# Patient Record
Sex: MALE | Race: WHITE | Employment: UNEMPLOYED | URBAN - METROPOLITAN AREA
[De-identification: names, ages, dates, MRNs, and addresses within clinical notes are randomized per-mention and may not be internally consistent; named-entity substitution may affect disease eponyms.]

---

## 2024-11-15 ENCOUNTER — APPOINTMENT (OUTPATIENT)
Dept: CT IMAGING | Age: 79
DRG: 870 | End: 2024-11-15
Payer: MEDICARE

## 2024-11-15 ENCOUNTER — HOSPITAL ENCOUNTER (INPATIENT)
Age: 79
LOS: 5 days | Discharge: HOSPICE | DRG: 870 | End: 2024-11-20
Attending: EMERGENCY MEDICINE | Admitting: INTERNAL MEDICINE
Payer: MEDICARE

## 2024-11-15 ENCOUNTER — APPOINTMENT (OUTPATIENT)
Dept: GENERAL RADIOLOGY | Age: 79
DRG: 870 | End: 2024-11-15
Payer: MEDICARE

## 2024-11-15 DIAGNOSIS — A41.9 SEPTIC SHOCK (HCC): ICD-10-CM

## 2024-11-15 DIAGNOSIS — I48.92 ATRIAL FLUTTER, UNSPECIFIED TYPE (HCC): ICD-10-CM

## 2024-11-15 DIAGNOSIS — N17.9 AKI (ACUTE KIDNEY INJURY) (HCC): ICD-10-CM

## 2024-11-15 DIAGNOSIS — J96.01 ACUTE RESPIRATORY FAILURE WITH HYPOXIA: Primary | ICD-10-CM

## 2024-11-15 DIAGNOSIS — R65.21 SEPTIC SHOCK (HCC): ICD-10-CM

## 2024-11-15 DIAGNOSIS — S06.5XAA SUBDURAL HEMATOMA: ICD-10-CM

## 2024-11-15 DIAGNOSIS — J18.9 PNEUMONIA DUE TO INFECTIOUS ORGANISM, UNSPECIFIED LATERALITY, UNSPECIFIED PART OF LUNG: ICD-10-CM

## 2024-11-15 DIAGNOSIS — J18.9 PNEUMONIA OF BOTH LOWER LOBES DUE TO INFECTIOUS ORGANISM: ICD-10-CM

## 2024-11-15 PROBLEM — E87.29 HIGH ANION GAP METABOLIC ACIDOSIS: Status: ACTIVE | Noted: 2024-11-15

## 2024-11-15 PROBLEM — F10.90 CHRONIC ALCOHOL USE: Status: ACTIVE | Noted: 2024-11-15

## 2024-11-15 PROBLEM — E87.0 HYPERNATREMIA: Status: ACTIVE | Noted: 2024-11-15

## 2024-11-15 PROBLEM — N18.9 ACUTE KIDNEY INJURY SUPERIMPOSED ON CKD (HCC): Status: ACTIVE | Noted: 2024-11-15

## 2024-11-15 PROBLEM — G93.41 ACUTE METABOLIC ENCEPHALOPATHY: Status: ACTIVE | Noted: 2024-11-15

## 2024-11-15 PROBLEM — J96.00 ACUTE RESPIRATORY FAILURE: Status: ACTIVE | Noted: 2024-11-15

## 2024-11-15 LAB
AADO2: 397.4 MMHG
AADO2: 459.8 MMHG
AADO2: 511.8 MMHG
ALBUMIN SERPL-MCNC: 3.7 G/DL (ref 3.5–5.2)
ALP SERPL-CCNC: 80 U/L (ref 40–129)
ALT SERPL-CCNC: 28 U/L (ref 0–40)
AMMONIA PLAS-SCNC: 20 UMOL/L (ref 16–60)
AMPHET UR QL SCN: NEGATIVE
ANION GAP SERPL CALCULATED.3IONS-SCNC: 13 MMOL/L (ref 7–16)
ANION GAP SERPL CALCULATED.3IONS-SCNC: 17 MMOL/L (ref 7–16)
ANION GAP SERPL CALCULATED.3IONS-SCNC: 18 MMOL/L (ref 7–16)
AST SERPL-CCNC: 46 U/L (ref 0–39)
B PARAP IS1001 DNA NPH QL NAA+NON-PROBE: NOT DETECTED
B PERT DNA SPEC QL NAA+PROBE: NOT DETECTED
B.E.: -10.2 MMOL/L (ref -3–3)
B.E.: -2 MMOL/L (ref -3–3)
B.E.: -8.6 MMOL/L (ref -3–3)
BARBITURATES UR QL SCN: NEGATIVE
BASOPHILS # BLD: 0 K/UL (ref 0–0.2)
BASOPHILS NFR BLD: 0 % (ref 0–2)
BENZODIAZ UR QL: NEGATIVE
BILIRUB SERPL-MCNC: 0.9 MG/DL (ref 0–1.2)
BNP SERPL-MCNC: 3598 PG/ML (ref 0–450)
BUN SERPL-MCNC: 73 MG/DL (ref 6–23)
BUN SERPL-MCNC: 74 MG/DL (ref 6–23)
BUN SERPL-MCNC: 76 MG/DL (ref 6–23)
BUPRENORPHINE UR QL: NEGATIVE
C PNEUM DNA NPH QL NAA+NON-PROBE: NOT DETECTED
CALCIUM SERPL-MCNC: 10 MG/DL (ref 8.6–10.2)
CALCIUM SERPL-MCNC: 8.1 MG/DL (ref 8.6–10.2)
CALCIUM SERPL-MCNC: 8.6 MG/DL (ref 8.6–10.2)
CANNABINOIDS UR QL SCN: NEGATIVE
CHLORIDE SERPL-SCNC: 116 MMOL/L (ref 98–107)
CHLORIDE SERPL-SCNC: 116 MMOL/L (ref 98–107)
CHLORIDE SERPL-SCNC: 118 MMOL/L (ref 98–107)
CHLORIDE UR-SCNC: <20 MMOL/L
CK SERPL-CCNC: 532 U/L (ref 20–200)
CO2 SERPL-SCNC: 18 MMOL/L (ref 22–29)
CO2 SERPL-SCNC: 21 MMOL/L (ref 22–29)
CO2 SERPL-SCNC: 22 MMOL/L (ref 22–29)
COCAINE UR QL SCN: NEGATIVE
COHB: 0.2 % (ref 0–1.5)
COHB: 0.3 % (ref 0–1.5)
COHB: 0.3 % (ref 0–1.5)
CREAT SERPL-MCNC: 3.4 MG/DL (ref 0.7–1.2)
CREAT SERPL-MCNC: 3.5 MG/DL (ref 0.7–1.2)
CREAT SERPL-MCNC: 3.6 MG/DL (ref 0.7–1.2)
CREAT UR-MCNC: 172.4 MG/DL (ref 40–278)
CRITICAL: ABNORMAL
CRP SERPL HS-MCNC: 286 MG/L (ref 0–5)
DATE ANALYZED: ABNORMAL
DATE OF COLLECTION: ABNORMAL
EOSINOPHIL # BLD: 0 K/UL (ref 0.05–0.5)
EOSINOPHILS RELATIVE PERCENT: 0 % (ref 0–6)
ERYTHROCYTE [DISTWIDTH] IN BLOOD BY AUTOMATED COUNT: 12.3 % (ref 11.5–15)
ERYTHROCYTE [SEDIMENTATION RATE] IN BLOOD BY WESTERGREN METHOD: 64 MM/HR (ref 0–15)
FENTANYL UR QL: POSITIVE
FIO2: 100 %
FIO2: 70 %
FIO2: 80 %
FLUAV RNA NPH QL NAA+NON-PROBE: NOT DETECTED
FLUBV RNA NPH QL NAA+NON-PROBE: NOT DETECTED
FOLATE SERPL-MCNC: 17.8 NG/ML (ref 4.8–24.2)
GFR, ESTIMATED: 17 ML/MIN/1.73M2
GFR, ESTIMATED: 17 ML/MIN/1.73M2
GFR, ESTIMATED: 18 ML/MIN/1.73M2
GLUCOSE BLD-MCNC: 403 MG/DL (ref 74–99)
GLUCOSE SERPL-MCNC: 147 MG/DL (ref 74–99)
GLUCOSE SERPL-MCNC: 308 MG/DL (ref 74–99)
GLUCOSE SERPL-MCNC: 447 MG/DL (ref 74–99)
HADV DNA NPH QL NAA+NON-PROBE: NOT DETECTED
HCO3: 15.7 MMOL/L (ref 22–26)
HCO3: 18 MMOL/L (ref 22–26)
HCO3: 21.4 MMOL/L (ref 22–26)
HCOV 229E RNA NPH QL NAA+NON-PROBE: NOT DETECTED
HCOV HKU1 RNA NPH QL NAA+NON-PROBE: NOT DETECTED
HCOV NL63 RNA NPH QL NAA+NON-PROBE: NOT DETECTED
HCOV OC43 RNA NPH QL NAA+NON-PROBE: NOT DETECTED
HCT VFR BLD AUTO: 44.3 % (ref 37–54)
HGB BLD-MCNC: 13.7 G/DL (ref 12.5–16.5)
HHB: 2.1 % (ref 0–5)
HHB: 7.4 % (ref 0–5)
HHB: 7.4 % (ref 0–5)
HMPV RNA NPH QL NAA+NON-PROBE: NOT DETECTED
HPIV1 RNA NPH QL NAA+NON-PROBE: NOT DETECTED
HPIV2 RNA NPH QL NAA+NON-PROBE: NOT DETECTED
HPIV3 RNA NPH QL NAA+NON-PROBE: NOT DETECTED
HPIV4 RNA NPH QL NAA+NON-PROBE: NOT DETECTED
INR PPP: 1.5
LAB: ABNORMAL
LACTATE BLDV-SCNC: 3.3 MMOL/L (ref 0.5–1.9)
LACTATE BLDV-SCNC: 3.5 MMOL/L (ref 0.5–2.2)
LACTATE BLDV-SCNC: 4.4 MMOL/L (ref 0.5–1.9)
LIPASE SERPL-CCNC: 13 U/L (ref 13–60)
LYMPHOCYTES NFR BLD: 0 K/UL (ref 1.5–4)
LYMPHOCYTES RELATIVE PERCENT: 0 % (ref 20–42)
Lab: 1004
Lab: 1945
Lab: 600
M PNEUMO DNA NPH QL NAA+NON-PROBE: NOT DETECTED
MAGNESIUM SERPL-MCNC: 2 MG/DL (ref 1.6–2.6)
MCH RBC QN AUTO: 29.5 PG (ref 26–35)
MCHC RBC AUTO-ENTMCNC: 30.9 G/DL (ref 32–34.5)
MCV RBC AUTO: 95.3 FL (ref 80–99.9)
METHADONE UR QL: NEGATIVE
METHB: 0.4 % (ref 0–1.5)
METHB: 0.6 % (ref 0–1.5)
METHB: 0.6 % (ref 0–1.5)
MODE: AC
MONOCYTES NFR BLD: 1.9 K/UL (ref 0.1–0.95)
MONOCYTES NFR BLD: 7 % (ref 2–12)
NEUTROPHILS NFR BLD: 93 % (ref 43–80)
NEUTS SEG NFR BLD: 25.4 K/UL (ref 1.8–7.3)
O2 SATURATION: 92.5 % (ref 92–98.5)
O2 SATURATION: 92.5 % (ref 92–98.5)
O2 SATURATION: 97.9 % (ref 92–98.5)
O2HB: 91.7 % (ref 94–97)
O2HB: 91.8 % (ref 94–97)
O2HB: 97.2 % (ref 94–97)
OPERATOR ID: 1741
OPERATOR ID: 187
OPERATOR ID: 2420
OPIATES UR QL SCN: NEGATIVE
OSMOLALITY UR: 482 MOSM/KG (ref 300–900)
OXYCODONE UR QL SCN: NEGATIVE
PATIENT TEMP: 37 C
PCO2: 31.9 MMHG (ref 35–45)
PCO2: 34.9 MMHG (ref 35–45)
PCO2: 40.9 MMHG (ref 35–45)
PCP UR QL SCN: NEGATIVE
PEEP/CPAP: 8 CMH2O
PEEP/CPAP: 8 CMH2O
PFO2: 0.93 MMHG/%
PFO2: 0.96 MMHG/%
PFO2: 1.35 MMHG/%
PH BLOOD GAS: 7.26 (ref 7.35–7.45)
PH BLOOD GAS: 7.27 (ref 7.35–7.45)
PH BLOOD GAS: 7.45 (ref 7.35–7.45)
PLATELET CONFIRMATION: NORMAL
PLATELET, FLUORESCENCE: 130 K/UL (ref 130–450)
PMV BLD AUTO: 13.7 FL (ref 7–12)
PO2: 135.3 MMHG (ref 75–100)
PO2: 67.4 MMHG (ref 75–100)
PO2: 74 MMHG (ref 75–100)
POTASSIUM SERPL-SCNC: 3.7 MMOL/L (ref 3.5–5)
POTASSIUM SERPL-SCNC: 3.9 MMOL/L (ref 3.5–5)
POTASSIUM SERPL-SCNC: 4.3 MMOL/L (ref 3.5–5)
PROCALCITONIN SERPL-MCNC: 77 NG/ML (ref 0–0.08)
PROT SERPL-MCNC: 7.7 G/DL (ref 6.4–8.3)
PROTHROMBIN TIME: 16.4 SEC (ref 9.3–12.4)
RBC # BLD AUTO: 4.65 M/UL (ref 3.8–5.8)
RBC # BLD: ABNORMAL 10*6/UL
RI(T): 3.78
RI(T): 5.9
RI(T): 6.21
RR MECHANICAL: 16 B/MIN
RR MECHANICAL: 18 B/MIN
RR MECHANICAL: 18 B/MIN
RSV RNA NPH QL NAA+NON-PROBE: NOT DETECTED
RV+EV RNA NPH QL NAA+NON-PROBE: NOT DETECTED
SARS-COV-2 RNA NPH QL NAA+NON-PROBE: DETECTED
SODIUM SERPL-SCNC: 151 MMOL/L (ref 132–146)
SODIUM SERPL-SCNC: 153 MMOL/L (ref 132–146)
SODIUM SERPL-SCNC: 155 MMOL/L (ref 132–146)
SODIUM UR-SCNC: <20 MMOL/L
SOURCE, BLOOD GAS: ABNORMAL
SPECIMEN DESCRIPTION: ABNORMAL
T4 FREE SERPL-MCNC: 1.6 NG/DL (ref 0.9–1.7)
TEST INFORMATION: ABNORMAL
THB: 11 G/DL (ref 11.5–16.5)
THB: 12.3 G/DL (ref 11.5–16.5)
THB: 13 G/DL (ref 11.5–16.5)
TIME ANALYZED: 1008
TIME ANALYZED: 1949
TIME ANALYZED: 603
TOTAL PROTEIN, URINE: 78 MG/DL (ref 0–12)
TROPONIN I SERPL HS-MCNC: 113 NG/L (ref 0–11)
TROPONIN I SERPL HS-MCNC: 115 NG/L (ref 0–11)
TROPONIN I SERPL HS-MCNC: 116 NG/L (ref 0–11)
TSH SERPL DL<=0.05 MIU/L-ACNC: 0.74 UIU/ML (ref 0.27–4.2)
VIT B12 SERPL-MCNC: 923 PG/ML (ref 211–946)
VT MECHANICAL: 450 ML
WBC OTHER # BLD: 27.3 K/UL (ref 4.5–11.5)

## 2024-11-15 PROCEDURE — 2580000003 HC RX 258: Performed by: INTERNAL MEDICINE

## 2024-11-15 PROCEDURE — 82746 ASSAY OF FOLIC ACID SERUM: CPT

## 2024-11-15 PROCEDURE — 94002 VENT MGMT INPAT INIT DAY: CPT

## 2024-11-15 PROCEDURE — 80053 COMPREHEN METABOLIC PANEL: CPT

## 2024-11-15 PROCEDURE — 31500 INSERT EMERGENCY AIRWAY: CPT

## 2024-11-15 PROCEDURE — 84443 ASSAY THYROID STIM HORMONE: CPT

## 2024-11-15 PROCEDURE — 80048 BASIC METABOLIC PNL TOTAL CA: CPT

## 2024-11-15 PROCEDURE — 2500000003 HC RX 250 WO HCPCS

## 2024-11-15 PROCEDURE — 82436 ASSAY OF URINE CHLORIDE: CPT

## 2024-11-15 PROCEDURE — 84145 PROCALCITONIN (PCT): CPT

## 2024-11-15 PROCEDURE — 82962 GLUCOSE BLOOD TEST: CPT

## 2024-11-15 PROCEDURE — 6360000002 HC RX W HCPCS: Performed by: EMERGENCY MEDICINE

## 2024-11-15 PROCEDURE — 6370000000 HC RX 637 (ALT 250 FOR IP)

## 2024-11-15 PROCEDURE — 96374 THER/PROPH/DIAG INJ IV PUSH: CPT

## 2024-11-15 PROCEDURE — 99222 1ST HOSP IP/OBS MODERATE 55: CPT | Performed by: NEUROLOGICAL SURGERY

## 2024-11-15 PROCEDURE — 87070 CULTURE OTHR SPECIMN AEROBIC: CPT

## 2024-11-15 PROCEDURE — 2580000003 HC RX 258: Performed by: EMERGENCY MEDICINE

## 2024-11-15 PROCEDURE — 74018 RADEX ABDOMEN 1 VIEW: CPT

## 2024-11-15 PROCEDURE — 02HV33Z INSERTION OF INFUSION DEVICE INTO SUPERIOR VENA CAVA, PERCUTANEOUS APPROACH: ICD-10-PCS | Performed by: INTERNAL MEDICINE

## 2024-11-15 PROCEDURE — 6360000002 HC RX W HCPCS: Performed by: NURSE PRACTITIONER

## 2024-11-15 PROCEDURE — 71275 CT ANGIOGRAPHY CHEST: CPT

## 2024-11-15 PROCEDURE — 83880 ASSAY OF NATRIURETIC PEPTIDE: CPT

## 2024-11-15 PROCEDURE — 2500000003 HC RX 250 WO HCPCS: Performed by: EMERGENCY MEDICINE

## 2024-11-15 PROCEDURE — 6360000004 HC RX CONTRAST MEDICATION: Performed by: RADIOLOGY

## 2024-11-15 PROCEDURE — 94640 AIRWAY INHALATION TREATMENT: CPT

## 2024-11-15 PROCEDURE — 86403 PARTICLE AGGLUT ANTBDY SCRN: CPT

## 2024-11-15 PROCEDURE — 87081 CULTURE SCREEN ONLY: CPT

## 2024-11-15 PROCEDURE — 6360000002 HC RX W HCPCS

## 2024-11-15 PROCEDURE — 99291 CRITICAL CARE FIRST HOUR: CPT | Performed by: INTERNAL MEDICINE

## 2024-11-15 PROCEDURE — 84156 ASSAY OF PROTEIN URINE: CPT

## 2024-11-15 PROCEDURE — 84439 ASSAY OF FREE THYROXINE: CPT

## 2024-11-15 PROCEDURE — 36556 INSERT NON-TUNNEL CV CATH: CPT

## 2024-11-15 PROCEDURE — 83735 ASSAY OF MAGNESIUM: CPT

## 2024-11-15 PROCEDURE — 82570 ASSAY OF URINE CREATININE: CPT

## 2024-11-15 PROCEDURE — 85025 COMPLETE CBC W/AUTO DIFF WBC: CPT

## 2024-11-15 PROCEDURE — 51702 INSERT TEMP BLADDER CATH: CPT

## 2024-11-15 PROCEDURE — 82140 ASSAY OF AMMONIA: CPT

## 2024-11-15 PROCEDURE — 87040 BLOOD CULTURE FOR BACTERIA: CPT

## 2024-11-15 PROCEDURE — 96365 THER/PROPH/DIAG IV INF INIT: CPT

## 2024-11-15 PROCEDURE — 2500000003 HC RX 250 WO HCPCS: Performed by: INTERNAL MEDICINE

## 2024-11-15 PROCEDURE — 83690 ASSAY OF LIPASE: CPT

## 2024-11-15 PROCEDURE — 6360000002 HC RX W HCPCS: Performed by: INTERNAL MEDICINE

## 2024-11-15 PROCEDURE — 82607 VITAMIN B-12: CPT

## 2024-11-15 PROCEDURE — 3E033XZ INTRODUCTION OF VASOPRESSOR INTO PERIPHERAL VEIN, PERCUTANEOUS APPROACH: ICD-10-PCS | Performed by: INTERNAL MEDICINE

## 2024-11-15 PROCEDURE — 0BH17EZ INSERTION OF ENDOTRACHEAL AIRWAY INTO TRACHEA, VIA NATURAL OR ARTIFICIAL OPENING: ICD-10-PCS | Performed by: INTERNAL MEDICINE

## 2024-11-15 PROCEDURE — 83935 ASSAY OF URINE OSMOLALITY: CPT

## 2024-11-15 PROCEDURE — 84300 ASSAY OF URINE SODIUM: CPT

## 2024-11-15 PROCEDURE — 6370000000 HC RX 637 (ALT 250 FOR IP): Performed by: NURSE PRACTITIONER

## 2024-11-15 PROCEDURE — 87077 CULTURE AEROBIC IDENTIFY: CPT

## 2024-11-15 PROCEDURE — 80307 DRUG TEST PRSMV CHEM ANLYZR: CPT

## 2024-11-15 PROCEDURE — 99285 EMERGENCY DEPT VISIT HI MDM: CPT

## 2024-11-15 PROCEDURE — 87449 NOS EACH ORGANISM AG IA: CPT

## 2024-11-15 PROCEDURE — 82550 ASSAY OF CK (CPK): CPT

## 2024-11-15 PROCEDURE — 85652 RBC SED RATE AUTOMATED: CPT

## 2024-11-15 PROCEDURE — 87899 AGENT NOS ASSAY W/OPTIC: CPT

## 2024-11-15 PROCEDURE — 86140 C-REACTIVE PROTEIN: CPT

## 2024-11-15 PROCEDURE — 87205 SMEAR GRAM STAIN: CPT

## 2024-11-15 PROCEDURE — 87154 CUL TYP ID BLD PTHGN 6+ TRGT: CPT

## 2024-11-15 PROCEDURE — 70450 CT HEAD/BRAIN W/O DYE: CPT

## 2024-11-15 PROCEDURE — 6370000000 HC RX 637 (ALT 250 FOR IP): Performed by: INTERNAL MEDICINE

## 2024-11-15 PROCEDURE — 96375 TX/PRO/DX INJ NEW DRUG ADDON: CPT

## 2024-11-15 PROCEDURE — 95710 EEG W/O VID EA 12-26HR CONT: CPT

## 2024-11-15 PROCEDURE — 2000000000 HC ICU R&B

## 2024-11-15 PROCEDURE — 84484 ASSAY OF TROPONIN QUANT: CPT

## 2024-11-15 PROCEDURE — 83605 ASSAY OF LACTIC ACID: CPT

## 2024-11-15 PROCEDURE — 94664 DEMO&/EVAL PT USE INHALER: CPT

## 2024-11-15 PROCEDURE — 0202U NFCT DS 22 TRGT SARS-COV-2: CPT

## 2024-11-15 PROCEDURE — 71045 X-RAY EXAM CHEST 1 VIEW: CPT

## 2024-11-15 PROCEDURE — 85610 PROTHROMBIN TIME: CPT

## 2024-11-15 PROCEDURE — 99223 1ST HOSP IP/OBS HIGH 75: CPT | Performed by: INTERNAL MEDICINE

## 2024-11-15 PROCEDURE — 93005 ELECTROCARDIOGRAM TRACING: CPT | Performed by: EMERGENCY MEDICINE

## 2024-11-15 PROCEDURE — 2580000003 HC RX 258: Performed by: NURSE PRACTITIONER

## 2024-11-15 PROCEDURE — 82805 BLOOD GASES W/O2 SATURATION: CPT

## 2024-11-15 PROCEDURE — 5A1955Z RESPIRATORY VENTILATION, GREATER THAN 96 CONSECUTIVE HOURS: ICD-10-PCS | Performed by: INTERNAL MEDICINE

## 2024-11-15 RX ORDER — GABAPENTIN 100 MG/1
100 CAPSULE ORAL DAILY
Status: ON HOLD | COMMUNITY

## 2024-11-15 RX ORDER — CHLORHEXIDINE GLUCONATE ORAL RINSE 1.2 MG/ML
15 SOLUTION DENTAL 2 TIMES DAILY
Status: DISCONTINUED | OUTPATIENT
Start: 2024-11-15 | End: 2024-11-20 | Stop reason: HOSPADM

## 2024-11-15 RX ORDER — ACETAMINOPHEN 325 MG/1
650 TABLET ORAL EVERY 4 HOURS PRN
Status: DISCONTINUED | OUTPATIENT
Start: 2024-11-15 | End: 2024-11-15

## 2024-11-15 RX ORDER — ACETAMINOPHEN 650 MG/1
650 SUPPOSITORY RECTAL ONCE
Status: COMPLETED | OUTPATIENT
Start: 2024-11-15 | End: 2024-11-15

## 2024-11-15 RX ORDER — NOREPINEPHRINE BITARTRATE 0.06 MG/ML
INJECTION, SOLUTION INTRAVENOUS
Status: COMPLETED
Start: 2024-11-15 | End: 2024-11-15

## 2024-11-15 RX ORDER — IPRATROPIUM BROMIDE AND ALBUTEROL SULFATE 2.5; .5 MG/3ML; MG/3ML
2 SOLUTION RESPIRATORY (INHALATION) ONCE
Status: DISCONTINUED | OUTPATIENT
Start: 2024-11-15 | End: 2024-11-15

## 2024-11-15 RX ORDER — NOREPINEPHRINE BITARTRATE 0.06 MG/ML
1-100 INJECTION, SOLUTION INTRAVENOUS CONTINUOUS
Status: DISCONTINUED | OUTPATIENT
Start: 2024-11-15 | End: 2024-11-18

## 2024-11-15 RX ORDER — SODIUM CHLORIDE AND POTASSIUM CHLORIDE 150; 450 MG/100ML; MG/100ML
INJECTION, SOLUTION INTRAVENOUS CONTINUOUS
Status: DISCONTINUED | OUTPATIENT
Start: 2024-11-15 | End: 2024-11-17

## 2024-11-15 RX ORDER — ATORVASTATIN CALCIUM 40 MG/1
40 TABLET, FILM COATED ORAL DAILY
Status: ON HOLD | COMMUNITY

## 2024-11-15 RX ORDER — MIDAZOLAM HYDROCHLORIDE 1 MG/ML
1-5 INJECTION, SOLUTION INTRAVENOUS CONTINUOUS
Status: DISCONTINUED | OUTPATIENT
Start: 2024-11-16 | End: 2024-11-18

## 2024-11-15 RX ORDER — IPRATROPIUM BROMIDE AND ALBUTEROL SULFATE 2.5; .5 MG/3ML; MG/3ML
1 SOLUTION RESPIRATORY (INHALATION)
Status: DISCONTINUED | OUTPATIENT
Start: 2024-11-15 | End: 2024-11-18

## 2024-11-15 RX ORDER — LEVETIRACETAM 500 MG/5ML
1000 INJECTION, SOLUTION, CONCENTRATE INTRAVENOUS ONCE
Status: COMPLETED | OUTPATIENT
Start: 2024-11-15 | End: 2024-11-15

## 2024-11-15 RX ORDER — SODIUM CHLORIDE, SODIUM LACTATE, POTASSIUM CHLORIDE, AND CALCIUM CHLORIDE .6; .31; .03; .02 G/100ML; G/100ML; G/100ML; G/100ML
1000 INJECTION, SOLUTION INTRAVENOUS ONCE
Status: COMPLETED | OUTPATIENT
Start: 2024-11-15 | End: 2024-11-15

## 2024-11-15 RX ORDER — BUDESONIDE 0.5 MG/2ML
0.5 INHALANT ORAL
Status: DISCONTINUED | OUTPATIENT
Start: 2024-11-15 | End: 2024-11-20 | Stop reason: HOSPADM

## 2024-11-15 RX ORDER — EPINEPHRINE 1 MG/ML
INJECTION, SOLUTION, CONCENTRATE INTRAVENOUS
Status: COMPLETED
Start: 2024-11-15 | End: 2024-11-15

## 2024-11-15 RX ORDER — FENTANYL CITRATE-0.9 % NACL/PF 20 MCG/2ML
50 SYRINGE (ML) INTRAVENOUS EVERY 30 MIN PRN
Status: DISCONTINUED | OUTPATIENT
Start: 2024-11-15 | End: 2024-11-19

## 2024-11-15 RX ORDER — MELOXICAM 7.5 MG/1
15 TABLET ORAL DAILY
Status: ON HOLD | COMMUNITY

## 2024-11-15 RX ORDER — SODIUM CHLORIDE, SODIUM LACTATE, POTASSIUM CHLORIDE, CALCIUM CHLORIDE 600; 310; 30; 20 MG/100ML; MG/100ML; MG/100ML; MG/100ML
INJECTION, SOLUTION INTRAVENOUS CONTINUOUS
Status: DISCONTINUED | OUTPATIENT
Start: 2024-11-15 | End: 2024-11-15

## 2024-11-15 RX ORDER — MINERAL OIL AND WHITE PETROLATUM 150; 830 MG/G; MG/G
OINTMENT OPHTHALMIC EVERY 4 HOURS
Status: DISCONTINUED | OUTPATIENT
Start: 2024-11-15 | End: 2024-11-15 | Stop reason: CLARIF

## 2024-11-15 RX ORDER — MINERAL OIL AND WHITE PETROLATUM 150; 830 MG/G; MG/G
OINTMENT OPHTHALMIC EVERY 4 HOURS
Status: DISCONTINUED | OUTPATIENT
Start: 2024-11-15 | End: 2024-11-20 | Stop reason: HOSPADM

## 2024-11-15 RX ORDER — DEXTROSE MONOHYDRATE, SODIUM CHLORIDE, AND POTASSIUM CHLORIDE 50; 1.49; 4.5 G/1000ML; G/1000ML; G/1000ML
INJECTION, SOLUTION INTRAVENOUS CONTINUOUS
Status: DISCONTINUED | OUTPATIENT
Start: 2024-11-15 | End: 2024-11-15

## 2024-11-15 RX ORDER — HEPARIN SODIUM 5000 [USP'U]/ML
5000 INJECTION, SOLUTION INTRAVENOUS; SUBCUTANEOUS EVERY 8 HOURS SCHEDULED
Status: DISCONTINUED | OUTPATIENT
Start: 2024-11-15 | End: 2024-11-20 | Stop reason: HOSPADM

## 2024-11-15 RX ORDER — METHOCARBAMOL 500 MG/1
1500 TABLET, FILM COATED ORAL 2 TIMES DAILY
Status: ON HOLD | COMMUNITY

## 2024-11-15 RX ORDER — LORATADINE 10 MG/1
10 TABLET ORAL DAILY
Status: ON HOLD | COMMUNITY

## 2024-11-15 RX ORDER — FENTANYL CITRATE-0.9 % NACL/PF 10 MCG/ML
25-200 PLASTIC BAG, INJECTION (ML) INTRAVENOUS CONTINUOUS
Status: DISCONTINUED | OUTPATIENT
Start: 2024-11-15 | End: 2024-11-19

## 2024-11-15 RX ORDER — SODIUM CHLORIDE, SODIUM LACTATE, POTASSIUM CHLORIDE, AND CALCIUM CHLORIDE .6; .31; .03; .02 G/100ML; G/100ML; G/100ML; G/100ML
1000 INJECTION, SOLUTION INTRAVENOUS ONCE
Status: COMPLETED | OUTPATIENT
Start: 2024-11-16 | End: 2024-11-16

## 2024-11-15 RX ORDER — LOSARTAN POTASSIUM 100 MG/1
100 TABLET ORAL DAILY
Status: ON HOLD | COMMUNITY

## 2024-11-15 RX ORDER — OMEPRAZOLE 40 MG/1
40 CAPSULE, DELAYED RELEASE ORAL DAILY
Status: ON HOLD | COMMUNITY

## 2024-11-15 RX ORDER — POLYVINYL ALCOHOL 14 MG/ML
1 SOLUTION/ DROPS OPHTHALMIC EVERY 4 HOURS
Status: DISCONTINUED | OUTPATIENT
Start: 2024-11-15 | End: 2024-11-15 | Stop reason: CLARIF

## 2024-11-15 RX ORDER — IOPAMIDOL 755 MG/ML
75 INJECTION, SOLUTION INTRAVASCULAR
Status: COMPLETED | OUTPATIENT
Start: 2024-11-15 | End: 2024-11-15

## 2024-11-15 RX ORDER — 0.9 % SODIUM CHLORIDE 0.9 %
30 INTRAVENOUS SOLUTION INTRAVENOUS ONCE
Status: COMPLETED | OUTPATIENT
Start: 2024-11-15 | End: 2024-11-15

## 2024-11-15 RX ORDER — LEVETIRACETAM 500 MG/5ML
1000 INJECTION, SOLUTION, CONCENTRATE INTRAVENOUS EVERY 12 HOURS
Status: DISCONTINUED | OUTPATIENT
Start: 2024-11-15 | End: 2024-11-16

## 2024-11-15 RX ORDER — DEXAMETHASONE SODIUM PHOSPHATE 10 MG/ML
10 INJECTION INTRAMUSCULAR; INTRAVENOUS ONCE
Status: COMPLETED | OUTPATIENT
Start: 2024-11-15 | End: 2024-11-15

## 2024-11-15 RX ORDER — ACETAMINOPHEN 325 MG/1
650 TABLET ORAL EVERY 6 HOURS PRN
Status: DISCONTINUED | OUTPATIENT
Start: 2024-11-15 | End: 2024-11-20 | Stop reason: HOSPADM

## 2024-11-15 RX ORDER — MIRTAZAPINE 7.5 MG/1
7.5 TABLET, FILM COATED ORAL NIGHTLY
Status: ON HOLD | COMMUNITY

## 2024-11-15 RX ADMIN — SODIUM BICARBONATE: 84 INJECTION, SOLUTION INTRAVENOUS at 16:16

## 2024-11-15 RX ADMIN — ACETAMINOPHEN 650 MG: 325 TABLET ORAL at 17:37

## 2024-11-15 RX ADMIN — PHENYLEPHRINE HYDROCHLORIDE 70 MCG/MIN: 10 INJECTION INTRAVENOUS at 08:12

## 2024-11-15 RX ADMIN — Medication 2 MG/HR: at 23:40

## 2024-11-15 RX ADMIN — WATER 100 MG: 1 INJECTION INTRAMUSCULAR; INTRAVENOUS; SUBCUTANEOUS at 10:18

## 2024-11-15 RX ADMIN — SODIUM CHLORIDE, POTASSIUM CHLORIDE, SODIUM LACTATE AND CALCIUM CHLORIDE 1000 ML: 600; 310; 30; 20 INJECTION, SOLUTION INTRAVENOUS at 11:16

## 2024-11-15 RX ADMIN — POTASSIUM CHLORIDE AND SODIUM CHLORIDE: 450; 150 INJECTION, SOLUTION INTRAVENOUS at 20:41

## 2024-11-15 RX ADMIN — PHENYLEPHRINE HYDROCHLORIDE 40 MCG/MIN: 10 INJECTION INTRAVENOUS at 07:24

## 2024-11-15 RX ADMIN — IOPAMIDOL 75 ML: 755 INJECTION, SOLUTION INTRAVENOUS at 06:48

## 2024-11-15 RX ADMIN — PHENYLEPHRINE HYDROCHLORIDE 30 MCG/MIN: 10 INJECTION INTRAVENOUS at 06:55

## 2024-11-15 RX ADMIN — LEVETIRACETAM 1000 MG: 100 INJECTION INTRAVENOUS at 07:18

## 2024-11-15 RX ADMIN — BUDESONIDE 500 MCG: 0.5 SUSPENSION RESPIRATORY (INHALATION) at 19:46

## 2024-11-15 RX ADMIN — Medication 20 MCG/MIN: at 06:00

## 2024-11-15 RX ADMIN — HEPARIN SODIUM 5000 UNITS: 5000 INJECTION INTRAVENOUS; SUBCUTANEOUS at 14:48

## 2024-11-15 RX ADMIN — Medication 15 MCG/MIN: at 14:59

## 2024-11-15 RX ADMIN — SODIUM BICARBONATE: 84 INJECTION, SOLUTION INTRAVENOUS at 12:46

## 2024-11-15 RX ADMIN — DEXAMETHASONE SODIUM PHOSPHATE 10 MG: 10 INJECTION INTRAMUSCULAR; INTRAVENOUS at 06:13

## 2024-11-15 RX ADMIN — CHLORHEXIDINE GLUCONATE, 0.12% ORAL RINSE 15 ML: 1.2 SOLUTION DENTAL at 10:19

## 2024-11-15 RX ADMIN — DEXMEDETOMIDINE 0.2 MCG/KG/HR: 100 INJECTION, SOLUTION INTRAVENOUS at 15:06

## 2024-11-15 RX ADMIN — Medication 50 MCG/HR: at 06:13

## 2024-11-15 RX ADMIN — MINERAL OIL, WHITE PETROLATUM: .03; .94 OINTMENT OPHTHALMIC at 22:06

## 2024-11-15 RX ADMIN — SODIUM BICARBONATE: 84 INJECTION, SOLUTION INTRAVENOUS at 11:44

## 2024-11-15 RX ADMIN — SODIUM CHLORIDE, POTASSIUM CHLORIDE, SODIUM LACTATE AND CALCIUM CHLORIDE: 600; 310; 30; 20 INJECTION, SOLUTION INTRAVENOUS at 11:55

## 2024-11-15 RX ADMIN — Medication 40 MCG/MIN: at 12:24

## 2024-11-15 RX ADMIN — MINERAL OIL, WHITE PETROLATUM: .03; .94 OINTMENT OPHTHALMIC at 14:48

## 2024-11-15 RX ADMIN — SODIUM CHLORIDE, PRESERVATIVE FREE 40 MG: 5 INJECTION INTRAVENOUS at 10:18

## 2024-11-15 RX ADMIN — SODIUM CHLORIDE 2421 ML: 9 INJECTION, SOLUTION INTRAVENOUS at 06:15

## 2024-11-15 RX ADMIN — LEVETIRACETAM 1000 MG: 100 INJECTION INTRAVENOUS at 11:46

## 2024-11-15 RX ADMIN — PHENYLEPHRINE HYDROCHLORIDE 50 MCG/MIN: 10 INJECTION INTRAVENOUS at 07:30

## 2024-11-15 RX ADMIN — POLYVINYL ALCOHOL, POVIDONE 1 DROP: 14; 6 SOLUTION/ DROPS OPHTHALMIC at 22:04

## 2024-11-15 RX ADMIN — SODIUM CHLORIDE, POTASSIUM CHLORIDE, SODIUM LACTATE AND CALCIUM CHLORIDE 1000 ML: 600; 310; 30; 20 INJECTION, SOLUTION INTRAVENOUS at 10:04

## 2024-11-15 RX ADMIN — EPINEPHRINE: 1 INJECTION, SOLUTION INTRAMUSCULAR; SUBCUTANEOUS at 05:00

## 2024-11-15 RX ADMIN — WATER 100 MG: 1 INJECTION INTRAMUSCULAR; INTRAVENOUS; SUBCUTANEOUS at 17:37

## 2024-11-15 RX ADMIN — MINERAL OIL, WHITE PETROLATUM: .03; .94 OINTMENT OPHTHALMIC at 17:37

## 2024-11-15 RX ADMIN — LEVETIRACETAM 1000 MG: 100 INJECTION INTRAVENOUS at 23:45

## 2024-11-15 RX ADMIN — CHLORHEXIDINE GLUCONATE, 0.12% ORAL RINSE 15 ML: 1.2 SOLUTION DENTAL at 22:04

## 2024-11-15 RX ADMIN — SODIUM CHLORIDE, POTASSIUM CHLORIDE, SODIUM LACTATE AND CALCIUM CHLORIDE 1000 ML: 600; 310; 30; 20 INJECTION, SOLUTION INTRAVENOUS at 23:42

## 2024-11-15 RX ADMIN — PIPERACILLIN AND TAZOBACTAM 4500 MG: 4; .5 INJECTION, POWDER, FOR SOLUTION INTRAVENOUS at 18:37

## 2024-11-15 RX ADMIN — ACETAMINOPHEN 650 MG: 650 SUPPOSITORY RECTAL at 07:10

## 2024-11-15 RX ADMIN — MINERAL OIL, WHITE PETROLATUM: .03; .94 OINTMENT OPHTHALMIC at 10:18

## 2024-11-15 RX ADMIN — IPRATROPIUM BROMIDE AND ALBUTEROL SULFATE 1 DOSE: 2.5; .5 SOLUTION RESPIRATORY (INHALATION) at 19:45

## 2024-11-15 RX ADMIN — PHENYLEPHRINE HYDROCHLORIDE 60 MCG/MIN: 10 INJECTION INTRAVENOUS at 07:37

## 2024-11-15 RX ADMIN — PIPERACILLIN AND TAZOBACTAM 4500 MG: 4; .5 INJECTION, POWDER, FOR SOLUTION INTRAVENOUS at 06:23

## 2024-11-15 ASSESSMENT — PULMONARY FUNCTION TESTS
PIF_VALUE: 19
PIF_VALUE: 19
PIF_VALUE: 20
PIF_VALUE: 18
PIF_VALUE: 20
PIF_VALUE: 18
PIF_VALUE: 19
PIF_VALUE: 21
PIF_VALUE: 17
PIF_VALUE: 19
PIF_VALUE: 19
PIF_VALUE: 0
PIF_VALUE: 21
PIF_VALUE: 18
PIF_VALUE: 20
PIF_VALUE: 20
PIF_VALUE: 19

## 2024-11-15 ASSESSMENT — PAIN SCALES - GENERAL
PAINLEVEL_OUTOF10: 0
PAINLEVEL_OUTOF10: 3
PAINLEVEL_OUTOF10: 0

## 2024-11-15 NOTE — ED NOTES
Radiology Procedure Waiver   Name: Hany Walter  : 1945  MRN: [id number]    Date:  11/15/24    Time: 6:30 AM EST    Benefits of immediately proceeding with Radiology exam(s) without pre-testing outweigh the risks or are not indicated as specified below and therefore the following is/are being waived:    [] Pregnancy test   [] Patients LMP on-time and regular.   [] Patient had Tubal Ligation or has other Contraception Device.   [] Patient  is Menopausal or Premenarcheal.    [] Patient had Full or Partial Hysterectomy.    [] Protocol for Iodine allergy    [] MRI Questionnaire     [x] BUN/Creatinine   [] Patient age w/no hx of renal dysfunction.   [] Patient on Dialysis.   [] Recent Normal Labs.  Electronically signed by Shaheed Elmore DO on 11/15/24 at 6:30 AM Shaheed Salazar DO  11/15/24 8533

## 2024-11-15 NOTE — H&P
[facility]   HISTORY AND PHYSICAL       CHIEF COMPLAINT:  had concerns including Respiratory Distress ((Arrived on NRB oxygen sats 84%)).     HISTORY OF PRESENT ILLNESS:     79-year-old male from [facility] presented to ED with chief concern of hypoxia and altered mentation.  Patient is from [place] had a fall and was admitted to [facility] in [place] and he was discharged degeneration for his worsening dementia. He has known medical condition include GERD, pancytopenia, type 2 diabetes mellitus, alcohol use disorder, aphasia, hypertension, frontotemporal dementia.  On arrival to the ED patient was hypoxic and altered. He was intubated for impending respiratory failure also started on pressor. CT head revealed bilateral subdural hematoma, other workup noted for sodium 155, high anion gap metabolic acidosis, lactic acidosis, elevated troponin 116 leukocytosis 27.3.  Patient was discussed with the ED physician and admitted to the medical ICU.     Informant for H&P: Patient, family and Medical Records     Discussed with the family at bedside    REVIEW OF SYSTEMS:  A comprehensive review of systems was negative except for: what is in the HPI    PHYSICAL EXAM:    General Appearance: Sedated  Skin: Multiple ecchymosis various stages  HEENT: normocephalic and atraumatic, pupils equal, round, and reactive to light, extraocular eye movements intact, conjunctivae normal  Chest: clear to auscultation bilaterally- no wheezes, rales or rhonchi, normal air movement, no respiratory distress  Cardiovascular: normal rate, normal S1 and S2 and no carotid bruits  Abdomen: soft, non-tender, non-distended, normal bowel sounds, no masses or organomegaly  Extremities: no cyanosis, no clubbing and no edema  Neurologic: Sedated and intubated    LABS:  Recent Labs     11/15/24  0556 11/15/24  1231   * 153*   K 3.7 4.3   * 118*   CO2 21* 18*   BUN 76* 74*   CREATININE 3.5*

## 2024-11-15 NOTE — ED NOTES
Pt wife called to leave her name and number when some one is able to I've her a call with an update. Her name is Isabella and her number is [phone]

## 2024-11-15 NOTE — ED PROVIDER NOTES
[facility] EMERGENCY DEPARTMENT  EMERGENCY DEPARTMENT ENCOUNTER        Pt Name: Hany Walter  MRN: [id number]  Birthdate 1945  Date of evaluation: 11/15/2024  Provider: Eduardo Sun DO  PCP: No primary care provider on file.  Note Started: 6:33 AM EST 11/15/24    CHIEF COMPLAINT       Chief Complaint   Patient presents with    Respiratory Distress     (Arrived on NRB oxygen sats 84%)       HISTORY OF PRESENT ILLNESS: 1 or more Elements   History From: EMS    Limitations to history : respiratory failure    Hany Walter is a 79 y.o. male who presented from [facility] facility for hypoxia.  Prior to arrival, the patient was found to be hypoxic.  En route to the hospital EMS placed him on a nonrebreather.  He was satting 84%.  Patient unable to answer questions due to his respiratory distress.  Decision was made to intubate the patient.    EMS did note that his facility stated he was COVID-positive.    Nursing Notes were all reviewed and agreed with or any disagreements were addressed in the HPI.      REVIEW OF SYSTEMS :           Positives and Pertinent negatives as per HPI.     SURGICAL HISTORY   No past surgical history on file.    CURRENTMEDICATIONS       Previous Medications    No medications on file       ALLERGIES     Patient has no allergy information on record.    FAMILYHISTORY     No family history on file.     SOCIAL HISTORY          SCREENINGS        Gore Coma Scale  Eye Opening: None  Best Verbal Response: None  Best Motor Response: None  Gore Coma Scale Score: 3                CIWA Assessment  BP: (!) 97/44  Pulse: (!) 130           PHYSICAL EXAM  1 or more Elements     ED Triage Vitals   BP Systolic BP Percentile Diastolic BP Percentile Temp Temp src Pulse Respirations SpO2   11/15/24 0535 -- -- -- -- 11/15/24 0535 11/15/24 0541 11/15/24 0535   (!) 103/56     (!) 132 18 90 %      Height Weight - Scale         11/15/24 0536 11/15/24 0536

## 2024-11-15 NOTE — FLOWSHEET NOTE
Intensivist ordered stat EEG . Attempted to call EEG tech, no answer. [employer] Rapid EEG applied for now. Neurology messaged via [employer].

## 2024-11-16 ENCOUNTER — APPOINTMENT (OUTPATIENT)
Dept: GENERAL RADIOLOGY | Age: 79
DRG: 870 | End: 2024-11-16
Payer: MEDICARE

## 2024-11-16 LAB
AADO2: 446.8 MMHG
ALBUMIN SERPL-MCNC: 2.9 G/DL (ref 3.5–5.2)
ALP SERPL-CCNC: 78 U/L (ref 40–129)
ALT SERPL-CCNC: 22 U/L (ref 0–40)
ANION GAP SERPL CALCULATED.3IONS-SCNC: 10 MMOL/L (ref 7–16)
ANION GAP SERPL CALCULATED.3IONS-SCNC: 11 MMOL/L (ref 7–16)
ANION GAP SERPL CALCULATED.3IONS-SCNC: 13 MMOL/L (ref 7–16)
ANION GAP SERPL CALCULATED.3IONS-SCNC: 9 MMOL/L (ref 7–16)
AST SERPL-CCNC: 44 U/L (ref 0–39)
B.E.: -2.2 MMOL/L (ref -3–3)
BASOPHILS # BLD: 0 K/UL (ref 0–0.2)
BASOPHILS NFR BLD: 0 % (ref 0–2)
BILIRUB SERPL-MCNC: 0.6 MG/DL (ref 0–1.2)
BUN SERPL-MCNC: 68 MG/DL (ref 6–23)
BUN SERPL-MCNC: 71 MG/DL (ref 6–23)
BUN SERPL-MCNC: 73 MG/DL (ref 6–23)
BUN SERPL-MCNC: 74 MG/DL (ref 6–23)
CALCIUM SERPL-MCNC: 8.1 MG/DL (ref 8.6–10.2)
CALCIUM SERPL-MCNC: 8.4 MG/DL (ref 8.6–10.2)
CHLORIDE SERPL-SCNC: 116 MMOL/L (ref 98–107)
CHLORIDE SERPL-SCNC: 116 MMOL/L (ref 98–107)
CHLORIDE SERPL-SCNC: 117 MMOL/L (ref 98–107)
CHLORIDE SERPL-SCNC: 117 MMOL/L (ref 98–107)
CO2 SERPL-SCNC: 23 MMOL/L (ref 22–29)
CO2 SERPL-SCNC: 24 MMOL/L (ref 22–29)
CO2 SERPL-SCNC: 25 MMOL/L (ref 22–29)
CO2 SERPL-SCNC: 25 MMOL/L (ref 22–29)
COHB: 0.3 % (ref 0–1.5)
CREAT SERPL-MCNC: 2.7 MG/DL (ref 0.7–1.2)
CREAT SERPL-MCNC: 2.9 MG/DL (ref 0.7–1.2)
CREAT SERPL-MCNC: 3 MG/DL (ref 0.7–1.2)
CREAT SERPL-MCNC: 3.1 MG/DL (ref 0.7–1.2)
CREAT UR-MCNC: 173.7 MG/DL (ref 40–278)
CRITICAL: ABNORMAL
DATE ANALYZED: ABNORMAL
DATE OF COLLECTION: ABNORMAL
EKG ATRIAL RATE: 147 BPM
EKG Q-T INTERVAL: 368 MS
EKG QRS DURATION: 88 MS
EKG QTC CALCULATION (BAZETT): 569 MS
EKG R AXIS: -18 DEGREES
EKG T AXIS: 75 DEGREES
EKG VENTRICULAR RATE: 144 BPM
EOSINOPHIL # BLD: 0 K/UL (ref 0.05–0.5)
EOSINOPHILS RELATIVE PERCENT: 0 % (ref 0–6)
ERYTHROCYTE [DISTWIDTH] IN BLOOD BY AUTOMATED COUNT: 12.4 % (ref 11.5–15)
FIO2: 80 %
GFR, ESTIMATED: 20 ML/MIN/1.73M2
GFR, ESTIMATED: 21 ML/MIN/1.73M2
GFR, ESTIMATED: 22 ML/MIN/1.73M2
GFR, ESTIMATED: 24 ML/MIN/1.73M2
GLUCOSE BLD-MCNC: 272 MG/DL (ref 74–99)
GLUCOSE BLD-MCNC: 280 MG/DL (ref 74–99)
GLUCOSE BLD-MCNC: 340 MG/DL (ref 74–99)
GLUCOSE SERPL-MCNC: 316 MG/DL (ref 74–99)
GLUCOSE SERPL-MCNC: 340 MG/DL (ref 74–99)
GLUCOSE SERPL-MCNC: 363 MG/DL (ref 74–99)
GLUCOSE SERPL-MCNC: 405 MG/DL (ref 74–99)
HCO3: 22.1 MMOL/L (ref 22–26)
HCT VFR BLD AUTO: 30.7 % (ref 37–54)
HGB BLD-MCNC: 9.6 G/DL (ref 12.5–16.5)
HHB: 4.3 % (ref 0–5)
INR PPP: 1.6
L PNEUMO1 AG UR QL IA.RAPID: NEGATIVE
LAB: ABNORMAL
LACTATE BLDV-SCNC: 1.5 MMOL/L (ref 0.5–2.2)
LACTATE BLDV-SCNC: 2.6 MMOL/L (ref 0.5–2.2)
LACTATE BLDV-SCNC: 3.3 MMOL/L (ref 0.5–2.2)
LYMPHOCYTES NFR BLD: 0.27 K/UL (ref 1.5–4)
LYMPHOCYTES RELATIVE PERCENT: 1 % (ref 20–42)
Lab: 505
MAGNESIUM SERPL-MCNC: 1.7 MG/DL (ref 1.6–2.6)
MCH RBC QN AUTO: 29.9 PG (ref 26–35)
MCHC RBC AUTO-ENTMCNC: 31.3 G/DL (ref 32–34.5)
MCV RBC AUTO: 95.6 FL (ref 80–99.9)
METAMYELOCYTES ABSOLUTE COUNT: 1.08 K/UL (ref 0–0.12)
METAMYELOCYTES: 4 % (ref 0–1)
METHB: 0.3 % (ref 0–1.5)
MICROORGANISM SPEC CULT: NORMAL
MODE: AC
MONOCYTES NFR BLD: 0.54 K/UL (ref 0.1–0.95)
MONOCYTES NFR BLD: 2 % (ref 2–12)
NEUTROPHILS NFR BLD: 94 % (ref 43–80)
NEUTS SEG NFR BLD: 29.21 K/UL (ref 1.8–7.3)
O2 SATURATION: 95.7 % (ref 92–98.5)
O2HB: 95.1 % (ref 94–97)
OPERATOR ID: ABNORMAL
PARTIAL THROMBOPLASTIN TIME: 35.9 SEC (ref 24.5–35.1)
PATIENT TEMP: 37 C
PCO2: 36.1 MMHG (ref 35–45)
PEEP/CPAP: 10 CMH2O
PFO2: 1.07 MMHG/%
PH BLOOD GAS: 7.41 (ref 7.35–7.45)
PHOSPHATE SERPL-MCNC: 2.8 MG/DL (ref 2.5–4.5)
PLATELET CONFIRMATION: NORMAL
PLATELET, FLUORESCENCE: 130 K/UL (ref 130–450)
PMV BLD AUTO: 13.5 FL (ref 7–12)
PO2: 85.7 MMHG (ref 75–100)
POTASSIUM SERPL-SCNC: 3.9 MMOL/L (ref 3.5–5)
POTASSIUM SERPL-SCNC: 4.1 MMOL/L (ref 3.5–5)
POTASSIUM SERPL-SCNC: 4.2 MMOL/L (ref 3.5–5)
POTASSIUM SERPL-SCNC: 4.3 MMOL/L (ref 3.5–5)
PROT SERPL-MCNC: 5.7 G/DL (ref 6.4–8.3)
PROTHROMBIN TIME: 17.9 SEC (ref 9.3–12.4)
RBC # BLD AUTO: 3.21 M/UL (ref 3.8–5.8)
RBC # BLD: ABNORMAL 10*6/UL
RI(T): 5.21
RR MECHANICAL: 18 B/MIN
S PNEUM AG SPEC QL: POSITIVE
SODIUM SERPL-SCNC: 151 MMOL/L (ref 132–146)
SODIUM SERPL-SCNC: 151 MMOL/L (ref 132–146)
SODIUM SERPL-SCNC: 152 MMOL/L (ref 132–146)
SODIUM SERPL-SCNC: 152 MMOL/L (ref 132–146)
SOURCE, BLOOD GAS: ABNORMAL
SPECIMEN DESCRIPTION: NORMAL
SPECIMEN SOURCE: ABNORMAL
THB: 11.3 G/DL (ref 11.5–16.5)
TIME ANALYZED: 508
TOTAL PROTEIN, URINE: 78 MG/DL (ref 0–12)
TROPONIN I SERPL HS-MCNC: 72 NG/L (ref 0–11)
TROPONIN I SERPL HS-MCNC: 83 NG/L (ref 0–11)
URINE TOTAL PROTEIN CREATININE RATIO: 0.45 (ref 0–0.2)
VT MECHANICAL: 450 ML
WBC OTHER # BLD: 31.1 K/UL (ref 4.5–11.5)

## 2024-11-16 PROCEDURE — 6360000002 HC RX W HCPCS: Performed by: PSYCHIATRY & NEUROLOGY

## 2024-11-16 PROCEDURE — 99291 CRITICAL CARE FIRST HOUR: CPT | Performed by: INTERNAL MEDICINE

## 2024-11-16 PROCEDURE — 84100 ASSAY OF PHOSPHORUS: CPT

## 2024-11-16 PROCEDURE — 2500000003 HC RX 250 WO HCPCS: Performed by: EMERGENCY MEDICINE

## 2024-11-16 PROCEDURE — 87040 BLOOD CULTURE FOR BACTERIA: CPT

## 2024-11-16 PROCEDURE — C9254 INJECTION, LACOSAMIDE: HCPCS | Performed by: PSYCHIATRY & NEUROLOGY

## 2024-11-16 PROCEDURE — 83605 ASSAY OF LACTIC ACID: CPT

## 2024-11-16 PROCEDURE — 82962 GLUCOSE BLOOD TEST: CPT

## 2024-11-16 PROCEDURE — 84484 ASSAY OF TROPONIN QUANT: CPT

## 2024-11-16 PROCEDURE — 2000000000 HC ICU R&B

## 2024-11-16 PROCEDURE — 83735 ASSAY OF MAGNESIUM: CPT

## 2024-11-16 PROCEDURE — 6370000000 HC RX 637 (ALT 250 FOR IP): Performed by: NURSE PRACTITIONER

## 2024-11-16 PROCEDURE — 2580000003 HC RX 258: Performed by: INTERNAL MEDICINE

## 2024-11-16 PROCEDURE — 93010 ELECTROCARDIOGRAM REPORT: CPT | Performed by: INTERNAL MEDICINE

## 2024-11-16 PROCEDURE — 85730 THROMBOPLASTIN TIME PARTIAL: CPT

## 2024-11-16 PROCEDURE — 6360000002 HC RX W HCPCS: Performed by: NURSE PRACTITIONER

## 2024-11-16 PROCEDURE — 6360000002 HC RX W HCPCS: Performed by: INTERNAL MEDICINE

## 2024-11-16 PROCEDURE — 99232 SBSQ HOSP IP/OBS MODERATE 35: CPT | Performed by: INTERNAL MEDICINE

## 2024-11-16 PROCEDURE — 80048 BASIC METABOLIC PNL TOTAL CA: CPT

## 2024-11-16 PROCEDURE — 71045 X-RAY EXAM CHEST 1 VIEW: CPT

## 2024-11-16 PROCEDURE — 94003 VENT MGMT INPAT SUBQ DAY: CPT

## 2024-11-16 PROCEDURE — P9047 ALBUMIN (HUMAN), 25%, 50ML: HCPCS | Performed by: INTERNAL MEDICINE

## 2024-11-16 PROCEDURE — 6370000000 HC RX 637 (ALT 250 FOR IP): Performed by: INTERNAL MEDICINE

## 2024-11-16 PROCEDURE — 80053 COMPREHEN METABOLIC PANEL: CPT

## 2024-11-16 PROCEDURE — 85025 COMPLETE CBC W/AUTO DIFF WBC: CPT

## 2024-11-16 PROCEDURE — 2580000003 HC RX 258: Performed by: PSYCHIATRY & NEUROLOGY

## 2024-11-16 PROCEDURE — 51702 INSERT TEMP BLADDER CATH: CPT

## 2024-11-16 PROCEDURE — 85610 PROTHROMBIN TIME: CPT

## 2024-11-16 PROCEDURE — 94640 AIRWAY INHALATION TREATMENT: CPT

## 2024-11-16 PROCEDURE — 95719 EEG PHYS/QHP EA INCR W/O VID: CPT | Performed by: PSYCHIATRY & NEUROLOGY

## 2024-11-16 PROCEDURE — 99223 1ST HOSP IP/OBS HIGH 75: CPT | Performed by: PSYCHIATRY & NEUROLOGY

## 2024-11-16 PROCEDURE — 36415 COLL VENOUS BLD VENIPUNCTURE: CPT

## 2024-11-16 PROCEDURE — 82805 BLOOD GASES W/O2 SATURATION: CPT

## 2024-11-16 RX ORDER — ALBUMIN (HUMAN) 12.5 G/50ML
25 SOLUTION INTRAVENOUS EVERY 6 HOURS
Status: COMPLETED | OUTPATIENT
Start: 2024-11-16 | End: 2024-11-16

## 2024-11-16 RX ORDER — INSULIN LISPRO 100 [IU]/ML
0-8 INJECTION, SOLUTION INTRAVENOUS; SUBCUTANEOUS
Status: DISCONTINUED | OUTPATIENT
Start: 2024-11-16 | End: 2024-11-20 | Stop reason: HOSPADM

## 2024-11-16 RX ORDER — MAGNESIUM SULFATE IN WATER 40 MG/ML
2000 INJECTION, SOLUTION INTRAVENOUS ONCE
Status: COMPLETED | OUTPATIENT
Start: 2024-11-16 | End: 2024-11-16

## 2024-11-16 RX ORDER — BUMETANIDE 0.25 MG/ML
0.5 INJECTION, SOLUTION INTRAMUSCULAR; INTRAVENOUS ONCE
Status: COMPLETED | OUTPATIENT
Start: 2024-11-16 | End: 2024-11-16

## 2024-11-16 RX ORDER — LEVETIRACETAM 500 MG/5ML
500 INJECTION, SOLUTION, CONCENTRATE INTRAVENOUS EVERY 12 HOURS
Status: DISCONTINUED | OUTPATIENT
Start: 2024-11-16 | End: 2024-11-20 | Stop reason: HOSPADM

## 2024-11-16 RX ADMIN — LEVETIRACETAM 500 MG: 100 INJECTION INTRAVENOUS at 23:14

## 2024-11-16 RX ADMIN — VANCOMYCIN HYDROCHLORIDE 1250 MG: 1.25 INJECTION, POWDER, LYOPHILIZED, FOR SOLUTION INTRAVENOUS at 11:04

## 2024-11-16 RX ADMIN — WATER 100 MG: 1 INJECTION INTRAMUSCULAR; INTRAVENOUS; SUBCUTANEOUS at 08:31

## 2024-11-16 RX ADMIN — INSULIN LISPRO 4 UNITS: 100 INJECTION, SOLUTION INTRAVENOUS; SUBCUTANEOUS at 21:37

## 2024-11-16 RX ADMIN — IPRATROPIUM BROMIDE AND ALBUTEROL SULFATE 1 DOSE: 2.5; .5 SOLUTION RESPIRATORY (INHALATION) at 03:34

## 2024-11-16 RX ADMIN — POLYVINYL ALCOHOL, POVIDONE 1 DROP: 14; 6 SOLUTION/ DROPS OPHTHALMIC at 08:32

## 2024-11-16 RX ADMIN — Medication 12 MCG/MIN: at 10:49

## 2024-11-16 RX ADMIN — POTASSIUM CHLORIDE AND SODIUM CHLORIDE 125 ML/HR: 450; 150 INJECTION, SOLUTION INTRAVENOUS at 12:30

## 2024-11-16 RX ADMIN — IPRATROPIUM BROMIDE AND ALBUTEROL SULFATE 1 DOSE: 2.5; .5 SOLUTION RESPIRATORY (INHALATION) at 00:02

## 2024-11-16 RX ADMIN — MINERAL OIL, WHITE PETROLATUM: .03; .94 OINTMENT OPHTHALMIC at 02:10

## 2024-11-16 RX ADMIN — WATER 100 MG: 1 INJECTION INTRAMUSCULAR; INTRAVENOUS; SUBCUTANEOUS at 15:45

## 2024-11-16 RX ADMIN — BUDESONIDE 500 MCG: 0.5 SUSPENSION RESPIRATORY (INHALATION) at 20:31

## 2024-11-16 RX ADMIN — CHLORHEXIDINE GLUCONATE, 0.12% ORAL RINSE 15 ML: 1.2 SOLUTION DENTAL at 20:11

## 2024-11-16 RX ADMIN — ALBUMIN (HUMAN) 25 G: 0.25 INJECTION, SOLUTION INTRAVENOUS at 10:50

## 2024-11-16 RX ADMIN — LEVETIRACETAM 500 MG: 100 INJECTION INTRAVENOUS at 10:53

## 2024-11-16 RX ADMIN — MAGNESIUM SULFATE HEPTAHYDRATE 2000 MG: 40 INJECTION, SOLUTION INTRAVENOUS at 07:04

## 2024-11-16 RX ADMIN — CHLORHEXIDINE GLUCONATE, 0.12% ORAL RINSE 15 ML: 1.2 SOLUTION DENTAL at 08:31

## 2024-11-16 RX ADMIN — HEPARIN SODIUM 5000 UNITS: 5000 INJECTION INTRAVENOUS; SUBCUTANEOUS at 21:34

## 2024-11-16 RX ADMIN — POTASSIUM CHLORIDE AND SODIUM CHLORIDE: 450; 150 INJECTION, SOLUTION INTRAVENOUS at 20:11

## 2024-11-16 RX ADMIN — MINERAL OIL, WHITE PETROLATUM: .03; .94 OINTMENT OPHTHALMIC at 12:39

## 2024-11-16 RX ADMIN — POTASSIUM CHLORIDE AND SODIUM CHLORIDE: 450; 150 INJECTION, SOLUTION INTRAVENOUS at 04:56

## 2024-11-16 RX ADMIN — PIPERACILLIN AND TAZOBACTAM 4500 MG: 4; .5 INJECTION, POWDER, FOR SOLUTION INTRAVENOUS at 16:29

## 2024-11-16 RX ADMIN — LACOSAMIDE 100 MG: 10 INJECTION INTRAVENOUS at 14:38

## 2024-11-16 RX ADMIN — POLYVINYL ALCOHOL, POVIDONE 1 DROP: 14; 6 SOLUTION/ DROPS OPHTHALMIC at 21:33

## 2024-11-16 RX ADMIN — HEPARIN SODIUM 5000 UNITS: 5000 INJECTION INTRAVENOUS; SUBCUTANEOUS at 12:38

## 2024-11-16 RX ADMIN — BUDESONIDE 500 MCG: 0.5 SUSPENSION RESPIRATORY (INHALATION) at 07:40

## 2024-11-16 RX ADMIN — MINERAL OIL, WHITE PETROLATUM: .03; .94 OINTMENT OPHTHALMIC at 06:23

## 2024-11-16 RX ADMIN — LACOSAMIDE 100 MG: 10 INJECTION INTRAVENOUS at 21:33

## 2024-11-16 RX ADMIN — IPRATROPIUM BROMIDE AND ALBUTEROL SULFATE 1 DOSE: 2.5; .5 SOLUTION RESPIRATORY (INHALATION) at 12:40

## 2024-11-16 RX ADMIN — BUMETANIDE 0.5 MG: 0.25 INJECTION INTRAMUSCULAR; INTRAVENOUS at 10:53

## 2024-11-16 RX ADMIN — VANCOMYCIN HYDROCHLORIDE 500 MG: 500 INJECTION, POWDER, LYOPHILIZED, FOR SOLUTION INTRAVENOUS at 15:34

## 2024-11-16 RX ADMIN — INSULIN LISPRO 6 UNITS: 100 INJECTION, SOLUTION INTRAVENOUS; SUBCUTANEOUS at 12:28

## 2024-11-16 RX ADMIN — MINERAL OIL, WHITE PETROLATUM: .03; .94 OINTMENT OPHTHALMIC at 08:31

## 2024-11-16 RX ADMIN — WATER 100 MG: 1 INJECTION INTRAMUSCULAR; INTRAVENOUS; SUBCUTANEOUS at 02:11

## 2024-11-16 RX ADMIN — Medication 50 MCG/HR: at 23:13

## 2024-11-16 RX ADMIN — SODIUM CHLORIDE, PRESERVATIVE FREE 40 MG: 5 INJECTION INTRAVENOUS at 08:32

## 2024-11-16 RX ADMIN — IPRATROPIUM BROMIDE AND ALBUTEROL SULFATE 1 DOSE: 2.5; .5 SOLUTION RESPIRATORY (INHALATION) at 07:40

## 2024-11-16 RX ADMIN — PIPERACILLIN AND TAZOBACTAM 4500 MG: 4; .5 INJECTION, POWDER, FOR SOLUTION INTRAVENOUS at 06:27

## 2024-11-16 RX ADMIN — Medication 50 MCG/HR: at 03:13

## 2024-11-16 RX ADMIN — IPRATROPIUM BROMIDE AND ALBUTEROL SULFATE 1 DOSE: 2.5; .5 SOLUTION RESPIRATORY (INHALATION) at 16:14

## 2024-11-16 RX ADMIN — IPRATROPIUM BROMIDE AND ALBUTEROL SULFATE 1 DOSE: 2.5; .5 SOLUTION RESPIRATORY (INHALATION) at 20:31

## 2024-11-16 RX ADMIN — INSULIN LISPRO 4 UNITS: 100 INJECTION, SOLUTION INTRAVENOUS; SUBCUTANEOUS at 15:42

## 2024-11-16 ASSESSMENT — PULMONARY FUNCTION TESTS
PIF_VALUE: 23
PIF_VALUE: 22
PIF_VALUE: 23
PIF_VALUE: 22
PIF_VALUE: 23
PIF_VALUE: 22
PIF_VALUE: 23
PIF_VALUE: 22
PIF_VALUE: 23
PIF_VALUE: 22
PIF_VALUE: 23
PIF_VALUE: 22
PIF_VALUE: 22
PIF_VALUE: 23
PIF_VALUE: 23
PIF_VALUE: 22
PIF_VALUE: 23
PIF_VALUE: 22
PIF_VALUE: 23
PIF_VALUE: 23
PIF_VALUE: 22

## 2024-11-16 ASSESSMENT — PAIN SCALES - GENERAL
PAINLEVEL_OUTOF10: 0

## 2024-11-16 ASSESSMENT — PAIN SCALES - WONG BAKER: WONGBAKER_NUMERICALRESPONSE: NO HURT

## 2024-11-16 NOTE — PROCEDURES
ELECTROENCEPHALOGRAM REPORT     DATE OF SERVICE: 2024  MRN: [id number]  PATIENT NAME: Hany Walter  Patient's : 1945  Patient's Age: 79 y.o.  Gender: male    History:  The patient is undergoing evaluation for altered mental status     Patient consent: Correct patient identified    Medications:  Current Facility-Administered Medications: albumin human 25% IV solution 25 g, 25 g, IntraVENous, Q6H  vancomycin (VANCOCIN) 1,250 mg in sodium chloride 0.9 % 250 mL IVPB (Mgso5Pkr), 1,250 mg, IntraVENous, Once  fentaNYL (SUBLIMAZE) 1,000 mcg in sodium chloride 0.9% 100 mL infusion,  mcg/hr, IntraVENous, Continuous **AND** fentaNYL (SUBLIMAZE) bolus from bag, 50 mcg, IntraVENous, Q30 Min PRN  norepinephrine (LEVOPHED) 16 mg in sodium chloride 0.9 % 250 mL infusion, 1-100 mcg/min, IntraVENous, Continuous  chlorhexidine (PERIDEX) 0.12 % solution 15 mL, 15 mL, Mouth/Throat, BID  pantoprazole (PROTONIX) 40 mg in sodium chloride (PF) 0.9 % 10 mL injection, 40 mg, IntraVENous, Daily  heparin (porcine) injection 5,000 Units, 5,000 Units, SubCUTAneous, 3 times per day  hydrocortisone sodium succinate PF (SOLU-CORTEF) 100 mg in sterile water 2 mL injection, 100 mg, IntraVENous, Q8H  piperacillin-tazobactam (ZOSYN) 4,500 mg in sodium chloride 0.9 % 100 mL IVPB (Pstb5Mnh), 4,500 mg, IntraVENous, Q12H  Polyvinyl Alcohol-Povidone PF (REFRESH) 1.4-0.6 % ophthalmic solution 1 drop, 1 drop, Both Eyes, Q4H **AND** lubrifresh P.M. (artificial tears) ophthalmic ointment, , Both Eyes, Q4H  levETIRAcetam (KEPPRA) injection 1,000 mg, 1,000 mg, IntraVENous, Q12H  acetaminophen (TYLENOL) tablet 650 mg, 650 mg, Oral, Q6H PRN  ipratropium 0.5 mg-albuterol 2.5 mg (DUONEB) nebulizer solution 1 Dose, 1 Dose, Inhalation, Q4H RT  budesonide (PULMICORT) nebulizer suspension 500 mcg, 0.5 mg, Nebulization, BID RT  0.45 % NaCl with KCl 20 mEq infusion, , IntraVENous, Continuous  midazolam (VERSED) 100mg/100mL in NS infusion, 1-5 mg/hr,

## 2024-11-16 NOTE — FLOWSHEET NOTE
Rosalina barkley called a #[phone] stat desk third time spoke to Brina on this call and she said she would dispatch phlebotomist

## 2024-11-17 ENCOUNTER — APPOINTMENT (OUTPATIENT)
Dept: GENERAL RADIOLOGY | Age: 79
DRG: 870 | End: 2024-11-17
Payer: MEDICARE

## 2024-11-17 ENCOUNTER — APPOINTMENT (OUTPATIENT)
Dept: MRI IMAGING | Age: 79
DRG: 870 | End: 2024-11-17
Payer: MEDICARE

## 2024-11-17 LAB
AADO2: 342.2 MMHG
ALBUMIN SERPL-MCNC: 3 G/DL (ref 3.5–5.2)
ALP SERPL-CCNC: 68 U/L (ref 40–129)
ALT SERPL-CCNC: 20 U/L (ref 0–40)
ANION GAP SERPL CALCULATED.3IONS-SCNC: 12 MMOL/L (ref 7–16)
ANION GAP SERPL CALCULATED.3IONS-SCNC: 5 MMOL/L (ref 7–16)
AST SERPL-CCNC: 33 U/L (ref 0–39)
B.E.: -4 MMOL/L (ref -3–3)
BASOPHILS # BLD: 0 K/UL (ref 0–0.2)
BASOPHILS NFR BLD: 0 % (ref 0–2)
BILIRUB SERPL-MCNC: 0.4 MG/DL (ref 0–1.2)
BUN SERPL-MCNC: 61 MG/DL (ref 6–23)
BUN SERPL-MCNC: 61 MG/DL (ref 6–23)
CALCIUM SERPL-MCNC: 8.6 MG/DL (ref 8.6–10.2)
CALCIUM SERPL-MCNC: 8.7 MG/DL (ref 8.6–10.2)
CHLORIDE SERPL-SCNC: 119 MMOL/L (ref 98–107)
CHLORIDE SERPL-SCNC: 120 MMOL/L (ref 98–107)
CO2 SERPL-SCNC: 22 MMOL/L (ref 22–29)
CO2 SERPL-SCNC: 23 MMOL/L (ref 22–29)
COHB: 0.3 % (ref 0–1.5)
CREAT SERPL-MCNC: 2.1 MG/DL (ref 0.7–1.2)
CREAT SERPL-MCNC: 2.2 MG/DL (ref 0.7–1.2)
CRITICAL: ABNORMAL
DATE ANALYZED: ABNORMAL
DATE LAST DOSE: NORMAL
DATE OF COLLECTION: ABNORMAL
EOSINOPHIL # BLD: 0 K/UL (ref 0.05–0.5)
EOSINOPHILS RELATIVE PERCENT: 0 % (ref 0–6)
ERYTHROCYTE [DISTWIDTH] IN BLOOD BY AUTOMATED COUNT: 12.3 % (ref 11.5–15)
FIO2: 65 %
GFR, ESTIMATED: 30 ML/MIN/1.73M2
GFR, ESTIMATED: 32 ML/MIN/1.73M2
GLUCOSE BLD-MCNC: 200 MG/DL (ref 74–99)
GLUCOSE BLD-MCNC: 221 MG/DL (ref 74–99)
GLUCOSE BLD-MCNC: 226 MG/DL (ref 74–99)
GLUCOSE BLD-MCNC: 267 MG/DL (ref 74–99)
GLUCOSE SERPL-MCNC: 209 MG/DL (ref 74–99)
GLUCOSE SERPL-MCNC: 233 MG/DL (ref 74–99)
HCO3: 19.8 MMOL/L (ref 22–26)
HCT VFR BLD AUTO: 28.3 % (ref 37–54)
HGB BLD-MCNC: 9 G/DL (ref 12.5–16.5)
HHB: 4 % (ref 0–5)
INR PPP: 1.2
LAB: ABNORMAL
LYMPHOCYTES NFR BLD: 0.27 K/UL (ref 1.5–4)
LYMPHOCYTES RELATIVE PERCENT: 1 % (ref 20–42)
Lab: 425
MAGNESIUM SERPL-MCNC: 2.2 MG/DL (ref 1.6–2.6)
MCH RBC QN AUTO: 29.9 PG (ref 26–35)
MCHC RBC AUTO-ENTMCNC: 31.8 G/DL (ref 32–34.5)
MCV RBC AUTO: 94 FL (ref 80–99.9)
METHB: 0.4 % (ref 0–1.5)
MODE: AC
MONOCYTES NFR BLD: 1.07 K/UL (ref 0.1–0.95)
MONOCYTES NFR BLD: 4 % (ref 2–12)
NEUTROPHILS NFR BLD: 96 % (ref 43–80)
NEUTS SEG NFR BLD: 29.07 K/UL (ref 1.8–7.3)
O2 SATURATION: 96 % (ref 92–98.5)
O2HB: 95.3 % (ref 94–97)
OPERATOR ID: 2593
PARTIAL THROMBOPLASTIN TIME: 32.2 SEC (ref 24.5–35.1)
PATIENT TEMP: 37 C
PCO2: 31.7 MMHG (ref 35–45)
PEEP/CPAP: 10 CMH2O
PFO2: 1.34 MMHG/%
PH BLOOD GAS: 7.41 (ref 7.35–7.45)
PHOSPHATE SERPL-MCNC: 2.1 MG/DL (ref 2.5–4.5)
PLATELET, FLUORESCENCE: 118 K/UL (ref 130–450)
PMV BLD AUTO: 13.3 FL (ref 7–12)
PO2: 86.8 MMHG (ref 75–100)
POTASSIUM SERPL-SCNC: 4.4 MMOL/L (ref 3.5–5)
POTASSIUM SERPL-SCNC: 4.6 MMOL/L (ref 3.5–5)
PROT SERPL-MCNC: 5.5 G/DL (ref 6.4–8.3)
PROTHROMBIN TIME: 12.6 SEC (ref 9.3–12.4)
RBC # BLD AUTO: 3.01 M/UL (ref 3.8–5.8)
RBC # BLD: ABNORMAL 10*6/UL
RI(T): 3.94
RR MECHANICAL: 18 B/MIN
SODIUM SERPL-SCNC: 148 MMOL/L (ref 132–146)
SODIUM SERPL-SCNC: 153 MMOL/L (ref 132–146)
SOURCE, BLOOD GAS: ABNORMAL
THB: 10.5 G/DL (ref 11.5–16.5)
TIME ANALYZED: 434
TME LAST DOSE: NORMAL H
VANCOMYCIN DOSE: NORMAL MG
VANCOMYCIN SERPL-MCNC: 6.1 UG/ML (ref 5–40)
VT MECHANICAL: 450 ML
WBC OTHER # BLD: 30.4 K/UL (ref 4.5–11.5)

## 2024-11-17 PROCEDURE — C9254 INJECTION, LACOSAMIDE: HCPCS | Performed by: PSYCHIATRY & NEUROLOGY

## 2024-11-17 PROCEDURE — 84100 ASSAY OF PHOSPHORUS: CPT

## 2024-11-17 PROCEDURE — 6360000002 HC RX W HCPCS: Performed by: PSYCHIATRY & NEUROLOGY

## 2024-11-17 PROCEDURE — 2000000000 HC ICU R&B

## 2024-11-17 PROCEDURE — 99291 CRITICAL CARE FIRST HOUR: CPT | Performed by: INTERNAL MEDICINE

## 2024-11-17 PROCEDURE — 2580000003 HC RX 258: Performed by: INTERNAL MEDICINE

## 2024-11-17 PROCEDURE — 85025 COMPLETE CBC W/AUTO DIFF WBC: CPT

## 2024-11-17 PROCEDURE — 2580000003 HC RX 258: Performed by: PSYCHIATRY & NEUROLOGY

## 2024-11-17 PROCEDURE — 70551 MRI BRAIN STEM W/O DYE: CPT

## 2024-11-17 PROCEDURE — 6360000002 HC RX W HCPCS: Performed by: NURSE PRACTITIONER

## 2024-11-17 PROCEDURE — 83735 ASSAY OF MAGNESIUM: CPT

## 2024-11-17 PROCEDURE — 2500000003 HC RX 250 WO HCPCS: Performed by: INTERNAL MEDICINE

## 2024-11-17 PROCEDURE — 80048 BASIC METABOLIC PNL TOTAL CA: CPT

## 2024-11-17 PROCEDURE — 80202 ASSAY OF VANCOMYCIN: CPT

## 2024-11-17 PROCEDURE — 6360000002 HC RX W HCPCS: Performed by: INTERNAL MEDICINE

## 2024-11-17 PROCEDURE — 6370000000 HC RX 637 (ALT 250 FOR IP): Performed by: NURSE PRACTITIONER

## 2024-11-17 PROCEDURE — 94640 AIRWAY INHALATION TREATMENT: CPT

## 2024-11-17 PROCEDURE — 85610 PROTHROMBIN TIME: CPT

## 2024-11-17 PROCEDURE — 6370000000 HC RX 637 (ALT 250 FOR IP): Performed by: INTERNAL MEDICINE

## 2024-11-17 PROCEDURE — 80053 COMPREHEN METABOLIC PANEL: CPT

## 2024-11-17 PROCEDURE — 82805 BLOOD GASES W/O2 SATURATION: CPT

## 2024-11-17 PROCEDURE — 82962 GLUCOSE BLOOD TEST: CPT

## 2024-11-17 PROCEDURE — 94003 VENT MGMT INPAT SUBQ DAY: CPT

## 2024-11-17 PROCEDURE — 85730 THROMBOPLASTIN TIME PARTIAL: CPT

## 2024-11-17 PROCEDURE — 2500000003 HC RX 250 WO HCPCS: Performed by: EMERGENCY MEDICINE

## 2024-11-17 PROCEDURE — 71045 X-RAY EXAM CHEST 1 VIEW: CPT

## 2024-11-17 PROCEDURE — 99232 SBSQ HOSP IP/OBS MODERATE 35: CPT | Performed by: INTERNAL MEDICINE

## 2024-11-17 RX ORDER — SODIUM CHLORIDE 450 MG/100ML
INJECTION, SOLUTION INTRAVENOUS CONTINUOUS
Status: DISCONTINUED | OUTPATIENT
Start: 2024-11-17 | End: 2024-11-19

## 2024-11-17 RX ADMIN — IPRATROPIUM BROMIDE AND ALBUTEROL SULFATE 1 DOSE: 2.5; .5 SOLUTION RESPIRATORY (INHALATION) at 16:20

## 2024-11-17 RX ADMIN — IPRATROPIUM BROMIDE AND ALBUTEROL SULFATE 1 DOSE: 2.5; .5 SOLUTION RESPIRATORY (INHALATION) at 20:30

## 2024-11-17 RX ADMIN — HEPARIN SODIUM 5000 UNITS: 5000 INJECTION INTRAVENOUS; SUBCUTANEOUS at 16:20

## 2024-11-17 RX ADMIN — PIPERACILLIN AND TAZOBACTAM 4500 MG: 4; .5 INJECTION, POWDER, FOR SOLUTION INTRAVENOUS at 16:25

## 2024-11-17 RX ADMIN — CHLORHEXIDINE GLUCONATE, 0.12% ORAL RINSE 15 ML: 1.2 SOLUTION DENTAL at 07:42

## 2024-11-17 RX ADMIN — HEPARIN SODIUM 5000 UNITS: 5000 INJECTION INTRAVENOUS; SUBCUTANEOUS at 06:23

## 2024-11-17 RX ADMIN — MINERAL OIL, WHITE PETROLATUM: .03; .94 OINTMENT OPHTHALMIC at 16:21

## 2024-11-17 RX ADMIN — Medication 25 MCG/HR: at 16:13

## 2024-11-17 RX ADMIN — POTASSIUM CHLORIDE AND SODIUM CHLORIDE: 450; 150 INJECTION, SOLUTION INTRAVENOUS at 04:20

## 2024-11-17 RX ADMIN — INSULIN LISPRO 2 UNITS: 100 INJECTION, SOLUTION INTRAVENOUS; SUBCUTANEOUS at 07:06

## 2024-11-17 RX ADMIN — BUDESONIDE 500 MCG: 0.5 SUSPENSION RESPIRATORY (INHALATION) at 09:02

## 2024-11-17 RX ADMIN — POLYVINYL ALCOHOL, POVIDONE 1 DROP: 14; 6 SOLUTION/ DROPS OPHTHALMIC at 22:28

## 2024-11-17 RX ADMIN — PIPERACILLIN AND TAZOBACTAM 4500 MG: 4; .5 INJECTION, POWDER, FOR SOLUTION INTRAVENOUS at 06:22

## 2024-11-17 RX ADMIN — INSULIN LISPRO 2 UNITS: 100 INJECTION, SOLUTION INTRAVENOUS; SUBCUTANEOUS at 20:41

## 2024-11-17 RX ADMIN — HEPARIN SODIUM 5000 UNITS: 5000 INJECTION INTRAVENOUS; SUBCUTANEOUS at 22:28

## 2024-11-17 RX ADMIN — POTASSIUM PHOSPHATE, MONOBASIC AND POTASSIUM PHOSPHATE, DIBASIC 20 MMOL: 224; 236 INJECTION, SOLUTION, CONCENTRATE INTRAVENOUS at 08:25

## 2024-11-17 RX ADMIN — SODIUM CHLORIDE, PRESERVATIVE FREE 40 MG: 5 INJECTION INTRAVENOUS at 07:43

## 2024-11-17 RX ADMIN — MINERAL OIL, WHITE PETROLATUM: .03; .94 OINTMENT OPHTHALMIC at 10:57

## 2024-11-17 RX ADMIN — IPRATROPIUM BROMIDE AND ALBUTEROL SULFATE 1 DOSE: 2.5; .5 SOLUTION RESPIRATORY (INHALATION) at 09:01

## 2024-11-17 RX ADMIN — SODIUM CHLORIDE: 4.5 INJECTION, SOLUTION INTRAVENOUS at 15:35

## 2024-11-17 RX ADMIN — INSULIN LISPRO 2 UNITS: 100 INJECTION, SOLUTION INTRAVENOUS; SUBCUTANEOUS at 11:00

## 2024-11-17 RX ADMIN — CHLORHEXIDINE GLUCONATE, 0.12% ORAL RINSE 15 ML: 1.2 SOLUTION DENTAL at 20:40

## 2024-11-17 RX ADMIN — LEVETIRACETAM 500 MG: 100 INJECTION INTRAVENOUS at 22:32

## 2024-11-17 RX ADMIN — Medication 2 MG/HR: at 09:29

## 2024-11-17 RX ADMIN — SODIUM CHLORIDE: 4.5 INJECTION, SOLUTION INTRAVENOUS at 22:26

## 2024-11-17 RX ADMIN — IPRATROPIUM BROMIDE AND ALBUTEROL SULFATE 1 DOSE: 2.5; .5 SOLUTION RESPIRATORY (INHALATION) at 00:12

## 2024-11-17 RX ADMIN — WATER 100 MG: 1 INJECTION INTRAMUSCULAR; INTRAVENOUS; SUBCUTANEOUS at 16:20

## 2024-11-17 RX ADMIN — INSULIN LISPRO 2 UNITS: 100 INJECTION, SOLUTION INTRAVENOUS; SUBCUTANEOUS at 16:24

## 2024-11-17 RX ADMIN — WATER 100 MG: 1 INJECTION INTRAMUSCULAR; INTRAVENOUS; SUBCUTANEOUS at 07:42

## 2024-11-17 RX ADMIN — LEVETIRACETAM 500 MG: 100 INJECTION INTRAVENOUS at 10:54

## 2024-11-17 RX ADMIN — LACOSAMIDE 100 MG: 10 INJECTION INTRAVENOUS at 20:43

## 2024-11-17 RX ADMIN — MINERAL OIL, WHITE PETROLATUM: .03; .94 OINTMENT OPHTHALMIC at 22:28

## 2024-11-17 RX ADMIN — POLYVINYL ALCOHOL, POVIDONE 1 DROP: 14; 6 SOLUTION/ DROPS OPHTHALMIC at 03:13

## 2024-11-17 RX ADMIN — SODIUM CHLORIDE: 4.5 INJECTION, SOLUTION INTRAVENOUS at 07:07

## 2024-11-17 RX ADMIN — WATER 100 MG: 1 INJECTION INTRAMUSCULAR; INTRAVENOUS; SUBCUTANEOUS at 03:13

## 2024-11-17 RX ADMIN — BUDESONIDE 500 MCG: 0.5 SUSPENSION RESPIRATORY (INHALATION) at 20:30

## 2024-11-17 RX ADMIN — POLYVINYL ALCOHOL, POVIDONE 1 DROP: 14; 6 SOLUTION/ DROPS OPHTHALMIC at 06:25

## 2024-11-17 RX ADMIN — IPRATROPIUM BROMIDE AND ALBUTEROL SULFATE 1 DOSE: 2.5; .5 SOLUTION RESPIRATORY (INHALATION) at 04:35

## 2024-11-17 RX ADMIN — SODIUM CHLORIDE 1500 MG: 9 INJECTION, SOLUTION INTRAVENOUS at 09:23

## 2024-11-17 RX ADMIN — Medication 4 MCG/MIN: at 09:46

## 2024-11-17 RX ADMIN — LACOSAMIDE 100 MG: 10 INJECTION INTRAVENOUS at 08:06

## 2024-11-17 RX ADMIN — IPRATROPIUM BROMIDE AND ALBUTEROL SULFATE 1 DOSE: 2.5; .5 SOLUTION RESPIRATORY (INHALATION) at 12:40

## 2024-11-17 ASSESSMENT — PAIN SCALES - GENERAL
PAINLEVEL_OUTOF10: 0

## 2024-11-17 ASSESSMENT — PULMONARY FUNCTION TESTS
PIF_VALUE: 23
PIF_VALUE: 22
PIF_VALUE: 23
PIF_VALUE: 23
PIF_VALUE: 22
PIF_VALUE: 23
PIF_VALUE: 23
PIF_VALUE: 22
PIF_VALUE: 22
PIF_VALUE: 23
PIF_VALUE: 22
PIF_VALUE: 22

## 2024-11-17 ASSESSMENT — PAIN SCALES - WONG BAKER
WONGBAKER_NUMERICALRESPONSE: NO HURT

## 2024-11-17 NOTE — FLOWSHEET NOTE
Patient intubated and sedate.  Becomes agitated and will reach to pull ett and lines.  Bilateral soft wrist restraints continued for safety.

## 2024-11-17 NOTE — FLOWSHEET NOTE
Family present at bedside . Sedation stopped to assess mental status of patient. Continues on ventilator.

## 2024-11-18 ENCOUNTER — APPOINTMENT (OUTPATIENT)
Age: 79
DRG: 870 | End: 2024-11-18
Attending: INTERNAL MEDICINE
Payer: MEDICARE

## 2024-11-18 ENCOUNTER — APPOINTMENT (OUTPATIENT)
Dept: GENERAL RADIOLOGY | Age: 79
DRG: 870 | End: 2024-11-18
Payer: MEDICARE

## 2024-11-18 PROBLEM — Z51.5 PALLIATIVE CARE ENCOUNTER: Status: ACTIVE | Noted: 2024-11-18

## 2024-11-18 LAB
AADO2: 181.2 MMHG
ALBUMIN SERPL-MCNC: 2.8 G/DL (ref 3.5–5.2)
ALP SERPL-CCNC: 61 U/L (ref 40–129)
ALT SERPL-CCNC: 18 U/L (ref 0–40)
ANION GAP SERPL CALCULATED.3IONS-SCNC: 11 MMOL/L (ref 7–16)
ANION GAP SERPL CALCULATED.3IONS-SCNC: 13 MMOL/L (ref 7–16)
AST SERPL-CCNC: 22 U/L (ref 0–39)
B.E.: -4.8 MMOL/L (ref -3–3)
BILIRUB SERPL-MCNC: 0.3 MG/DL (ref 0–1.2)
BUN SERPL-MCNC: 59 MG/DL (ref 6–23)
BUN SERPL-MCNC: 60 MG/DL (ref 6–23)
CALCIUM SERPL-MCNC: 8 MG/DL (ref 8.6–10.2)
CALCIUM SERPL-MCNC: 8.3 MG/DL (ref 8.6–10.2)
CHLORIDE SERPL-SCNC: 118 MMOL/L (ref 98–107)
CHLORIDE SERPL-SCNC: 120 MMOL/L (ref 98–107)
CO2 SERPL-SCNC: 17 MMOL/L (ref 22–29)
CO2 SERPL-SCNC: 19 MMOL/L (ref 22–29)
COHB: 0.3 % (ref 0–1.5)
CREAT SERPL-MCNC: 1.8 MG/DL (ref 0.7–1.2)
CREAT SERPL-MCNC: 1.8 MG/DL (ref 0.7–1.2)
CRITICAL: ABNORMAL
DATE ANALYZED: ABNORMAL
DATE LAST DOSE: NORMAL
DATE OF COLLECTION: ABNORMAL
ECHO AO ASC DIAM: 3.3 CM
ECHO AO ASCENDING AORTA INDEX: 1.69 CM/M2
ECHO AV AREA PEAK VELOCITY: 3.6 CM2
ECHO AV AREA VTI: 4 CM2
ECHO AV AREA/BSA PEAK VELOCITY: 1.8 CM2/M2
ECHO AV AREA/BSA VTI: 2.1 CM2/M2
ECHO AV CUSP MM: 1.8 CM
ECHO AV MEAN GRADIENT: 3 MMHG
ECHO AV MEAN VELOCITY: 0.8 M/S
ECHO AV PEAK GRADIENT: 4 MMHG
ECHO AV PEAK VELOCITY: 1 M/S
ECHO AV VELOCITY RATIO: 0.9
ECHO AV VTI: 18.9 CM
ECHO BSA: 1.97 M2
ECHO EST RA PRESSURE: 8 MMHG
ECHO LA DIAMETER INDEX: 2.15 CM/M2
ECHO LA DIAMETER: 4.2 CM
ECHO LA VOL A-L A2C: 44 ML (ref 18–58)
ECHO LA VOL A-L A4C: 73 ML (ref 18–58)
ECHO LA VOL MOD A2C: 42 ML (ref 18–58)
ECHO LA VOL MOD A4C: 71 ML (ref 18–58)
ECHO LA VOLUME AREA LENGTH: 59 ML
ECHO LA VOLUME INDEX A-L A2C: 23 ML/M2 (ref 16–34)
ECHO LA VOLUME INDEX A-L A4C: 37 ML/M2 (ref 16–34)
ECHO LA VOLUME INDEX AREA LENGTH: 30 ML/M2 (ref 16–34)
ECHO LA VOLUME INDEX MOD A2C: 22 ML/M2 (ref 16–34)
ECHO LA VOLUME INDEX MOD A4C: 36 ML/M2 (ref 16–34)
ECHO LV E' LATERAL VELOCITY: 8 CM/S
ECHO LV E' SEPTAL VELOCITY: 6 CM/S
ECHO LV EDV A4C: 172 ML
ECHO LV EDV INDEX A4C: 88 ML/M2
ECHO LV EF PHYSICIAN: 45 %
ECHO LV EJECTION FRACTION A4C: 45 %
ECHO LV ESV A4C: 94 ML
ECHO LV ESV INDEX A4C: 48 ML/M2
ECHO LV FRACTIONAL SHORTENING: 17 % (ref 28–44)
ECHO LV INTERNAL DIMENSION DIASTOLE INDEX: 2.41 CM/M2
ECHO LV INTERNAL DIMENSION DIASTOLIC: 4.7 CM (ref 4.2–5.9)
ECHO LV INTERNAL DIMENSION SYSTOLIC INDEX: 2 CM/M2
ECHO LV INTERNAL DIMENSION SYSTOLIC: 3.9 CM
ECHO LV ISOVOLUMETRIC RELAXATION TIME (IVRT): 78.4 MS
ECHO LV IVSD: 0.9 CM (ref 0.6–1)
ECHO LV IVSS: 1.2 CM
ECHO LV MASS 2D: 153.4 G (ref 88–224)
ECHO LV MASS INDEX 2D: 78.7 G/M2 (ref 49–115)
ECHO LV POSTERIOR WALL DIASTOLIC: 1 CM (ref 0.6–1)
ECHO LV POSTERIOR WALL SYSTOLIC: 1.4 CM
ECHO LV RELATIVE WALL THICKNESS RATIO: 0.43
ECHO LVOT AREA: 4.2 CM2
ECHO LVOT AV VTI INDEX: 0.98
ECHO LVOT DIAM: 2.3 CM
ECHO LVOT MEAN GRADIENT: 2 MMHG
ECHO LVOT PEAK GRADIENT: 3 MMHG
ECHO LVOT PEAK VELOCITY: 0.9 M/S
ECHO LVOT STROKE VOLUME INDEX: 39.6 ML/M2
ECHO LVOT SV: 77.2 ML
ECHO LVOT VTI: 18.6 CM
ECHO MV AREA PHT: 3.5 CM2
ECHO MV AREA VTI: 3.4 CM2
ECHO MV LVOT VTI INDEX: 1.2
ECHO MV MAX VELOCITY: 0.7 M/S
ECHO MV MEAN GRADIENT: 1 MMHG
ECHO MV MEAN VELOCITY: 0.5 M/S
ECHO MV PEAK GRADIENT: 2 MMHG
ECHO MV PRESSURE HALF TIME (PHT): 63 MS
ECHO MV VTI: 22.4 CM
ECHO PV MAX VELOCITY: 0.9 M/S
ECHO PV MEAN GRADIENT: 2 MMHG
ECHO PV MEAN VELOCITY: 0.7 M/S
ECHO PV PEAK GRADIENT: 3 MMHG
ECHO PV VTI: 20 CM
ECHO RIGHT VENTRICULAR SYSTOLIC PRESSURE (RVSP): 31 MMHG
ECHO RV INTERNAL DIMENSION: 2.8 CM
ECHO RV TAPSE: 1.8 CM (ref 1.7–?)
ECHO TV REGURGITANT MAX VELOCITY: 2.42 M/S
ECHO TV REGURGITANT PEAK GRADIENT: 23 MMHG
FIO2: 40 %
GFR, ESTIMATED: 38 ML/MIN/1.73M2
GFR, ESTIMATED: 39 ML/MIN/1.73M2
GLUCOSE BLD-MCNC: 230 MG/DL (ref 74–99)
GLUCOSE BLD-MCNC: 249 MG/DL (ref 74–99)
GLUCOSE BLD-MCNC: 262 MG/DL (ref 74–99)
GLUCOSE SERPL-MCNC: 203 MG/DL (ref 74–99)
GLUCOSE SERPL-MCNC: 225 MG/DL (ref 74–99)
HCO3: 18.6 MMOL/L (ref 22–26)
HHB: 6.5 % (ref 0–5)
INR PPP: 1.1
LAB: ABNORMAL
Lab: 436
MAGNESIUM SERPL-MCNC: 2.1 MG/DL (ref 1.6–2.6)
METHB: 0.4 % (ref 0–1.5)
MICROORGANISM SPEC CULT: ABNORMAL
MICROORGANISM/AGENT SPEC: ABNORMAL
MODE: AC
O2 SATURATION: 93.5 % (ref 92–98.5)
O2HB: 92.8 % (ref 94–97)
OPERATOR ID: 8219
PARTIAL THROMBOPLASTIN TIME: 28.6 SEC (ref 24.5–35.1)
PATIENT TEMP: 37 C
PCO2: 28.2 MMHG (ref 35–45)
PEEP/CPAP: 10 CMH2O
PFO2: 1.79 MMHG/%
PH BLOOD GAS: 7.44 (ref 7.35–7.45)
PHOSPHATE SERPL-MCNC: 3 MG/DL (ref 2.5–4.5)
PLATELET CONFIRMATION: NORMAL
PO2: 71.6 MMHG (ref 75–100)
POTASSIUM SERPL-SCNC: 4.4 MMOL/L (ref 3.5–5)
POTASSIUM SERPL-SCNC: 4.5 MMOL/L (ref 3.5–5)
PROT SERPL-MCNC: 5 G/DL (ref 6.4–8.3)
PROTHROMBIN TIME: 12.2 SEC (ref 9.3–12.4)
RI(T): 2.53
RR MECHANICAL: 18 B/MIN
SODIUM SERPL-SCNC: 148 MMOL/L (ref 132–146)
SODIUM SERPL-SCNC: 150 MMOL/L (ref 132–146)
SOURCE, BLOOD GAS: ABNORMAL
SPECIMEN DESCRIPTION: ABNORMAL
THB: 9.3 G/DL (ref 11.5–16.5)
TIME ANALYZED: 444
TME LAST DOSE: NORMAL H
VANCOMYCIN DOSE: NORMAL MG
VANCOMYCIN SERPL-MCNC: 14.2 UG/ML (ref 5–40)
VT MECHANICAL: 450 ML

## 2024-11-18 PROCEDURE — 82962 GLUCOSE BLOOD TEST: CPT

## 2024-11-18 PROCEDURE — 6370000000 HC RX 637 (ALT 250 FOR IP): Performed by: INTERNAL MEDICINE

## 2024-11-18 PROCEDURE — 80048 BASIC METABOLIC PNL TOTAL CA: CPT

## 2024-11-18 PROCEDURE — 2580000003 HC RX 258: Performed by: INTERNAL MEDICINE

## 2024-11-18 PROCEDURE — 99231 SBSQ HOSP IP/OBS SF/LOW 25: CPT | Performed by: INTERNAL MEDICINE

## 2024-11-18 PROCEDURE — 6360000002 HC RX W HCPCS: Performed by: NURSE PRACTITIONER

## 2024-11-18 PROCEDURE — 80202 ASSAY OF VANCOMYCIN: CPT

## 2024-11-18 PROCEDURE — 2500000003 HC RX 250 WO HCPCS: Performed by: EMERGENCY MEDICINE

## 2024-11-18 PROCEDURE — 93306 TTE W/DOPPLER COMPLETE: CPT | Performed by: INTERNAL MEDICINE

## 2024-11-18 PROCEDURE — 2580000003 HC RX 258: Performed by: PSYCHIATRY & NEUROLOGY

## 2024-11-18 PROCEDURE — C8929 TTE W OR WO FOL WCON,DOPPLER: HCPCS

## 2024-11-18 PROCEDURE — 99233 SBSQ HOSP IP/OBS HIGH 50: CPT | Performed by: CLINICAL NURSE SPECIALIST

## 2024-11-18 PROCEDURE — 82805 BLOOD GASES W/O2 SATURATION: CPT

## 2024-11-18 PROCEDURE — 83735 ASSAY OF MAGNESIUM: CPT

## 2024-11-18 PROCEDURE — 71045 X-RAY EXAM CHEST 1 VIEW: CPT

## 2024-11-18 PROCEDURE — 99222 1ST HOSP IP/OBS MODERATE 55: CPT | Performed by: NURSE PRACTITIONER

## 2024-11-18 PROCEDURE — 94003 VENT MGMT INPAT SUBQ DAY: CPT

## 2024-11-18 PROCEDURE — 80053 COMPREHEN METABOLIC PANEL: CPT

## 2024-11-18 PROCEDURE — 6360000002 HC RX W HCPCS: Performed by: INTERNAL MEDICINE

## 2024-11-18 PROCEDURE — 6360000002 HC RX W HCPCS: Performed by: PSYCHIATRY & NEUROLOGY

## 2024-11-18 PROCEDURE — 85025 COMPLETE CBC W/AUTO DIFF WBC: CPT

## 2024-11-18 PROCEDURE — 6370000000 HC RX 637 (ALT 250 FOR IP): Performed by: NURSE PRACTITIONER

## 2024-11-18 PROCEDURE — 85730 THROMBOPLASTIN TIME PARTIAL: CPT

## 2024-11-18 PROCEDURE — 2000000000 HC ICU R&B

## 2024-11-18 PROCEDURE — C9254 INJECTION, LACOSAMIDE: HCPCS | Performed by: PSYCHIATRY & NEUROLOGY

## 2024-11-18 PROCEDURE — 85610 PROTHROMBIN TIME: CPT

## 2024-11-18 PROCEDURE — 6360000004 HC RX CONTRAST MEDICATION

## 2024-11-18 PROCEDURE — 99291 CRITICAL CARE FIRST HOUR: CPT | Performed by: INTERNAL MEDICINE

## 2024-11-18 PROCEDURE — 94640 AIRWAY INHALATION TREATMENT: CPT

## 2024-11-18 PROCEDURE — 84100 ASSAY OF PHOSPHORUS: CPT

## 2024-11-18 RX ORDER — GLUCAGON 1 MG/ML
1 KIT INJECTION PRN
Status: DISCONTINUED | OUTPATIENT
Start: 2024-11-18 | End: 2024-11-20 | Stop reason: HOSPADM

## 2024-11-18 RX ORDER — DEXAMETHASONE SODIUM PHOSPHATE 10 MG/ML
6 INJECTION, SOLUTION INTRAMUSCULAR; INTRAVENOUS DAILY
Status: DISCONTINUED | OUTPATIENT
Start: 2024-11-18 | End: 2024-11-20 | Stop reason: HOSPADM

## 2024-11-18 RX ORDER — MODAFINIL 100 MG/1
200 TABLET ORAL DAILY
Status: DISCONTINUED | OUTPATIENT
Start: 2024-11-19 | End: 2024-11-20 | Stop reason: HOSPADM

## 2024-11-18 RX ORDER — SODIUM CHLORIDE 0.9 % (FLUSH) 0.9 %
5-40 SYRINGE (ML) INJECTION EVERY 12 HOURS SCHEDULED
Status: DISCONTINUED | OUTPATIENT
Start: 2024-11-18 | End: 2024-11-20 | Stop reason: HOSPADM

## 2024-11-18 RX ORDER — LIDOCAINE HYDROCHLORIDE 10 MG/ML
50 INJECTION, SOLUTION EPIDURAL; INFILTRATION; INTRACAUDAL; PERINEURAL ONCE
Status: DISCONTINUED | OUTPATIENT
Start: 2024-11-18 | End: 2024-11-20 | Stop reason: HOSPADM

## 2024-11-18 RX ORDER — IPRATROPIUM BROMIDE AND ALBUTEROL SULFATE 2.5; .5 MG/3ML; MG/3ML
1 SOLUTION RESPIRATORY (INHALATION)
Status: DISCONTINUED | OUTPATIENT
Start: 2024-11-18 | End: 2024-11-20 | Stop reason: HOSPADM

## 2024-11-18 RX ORDER — INSULIN GLARGINE 100 [IU]/ML
8 INJECTION, SOLUTION SUBCUTANEOUS DAILY
Status: DISCONTINUED | OUTPATIENT
Start: 2024-11-18 | End: 2024-11-19

## 2024-11-18 RX ORDER — SODIUM CHLORIDE 9 MG/ML
INJECTION, SOLUTION INTRAVENOUS PRN
Status: DISCONTINUED | OUTPATIENT
Start: 2024-11-18 | End: 2024-11-20 | Stop reason: HOSPADM

## 2024-11-18 RX ORDER — SODIUM CHLORIDE 0.9 % (FLUSH) 0.9 %
5-40 SYRINGE (ML) INJECTION PRN
Status: DISCONTINUED | OUTPATIENT
Start: 2024-11-18 | End: 2024-11-20 | Stop reason: HOSPADM

## 2024-11-18 RX ORDER — DEXTROSE MONOHYDRATE 100 MG/ML
INJECTION, SOLUTION INTRAVENOUS CONTINUOUS PRN
Status: DISCONTINUED | OUTPATIENT
Start: 2024-11-18 | End: 2024-11-20 | Stop reason: HOSPADM

## 2024-11-18 RX ADMIN — BUDESONIDE 500 MCG: 0.5 SUSPENSION RESPIRATORY (INHALATION) at 08:15

## 2024-11-18 RX ADMIN — LEVETIRACETAM 500 MG: 100 INJECTION INTRAVENOUS at 11:35

## 2024-11-18 RX ADMIN — SODIUM CHLORIDE, PRESERVATIVE FREE 40 MG: 5 INJECTION INTRAVENOUS at 08:46

## 2024-11-18 RX ADMIN — INSULIN LISPRO 2 UNITS: 100 INJECTION, SOLUTION INTRAVENOUS; SUBCUTANEOUS at 20:17

## 2024-11-18 RX ADMIN — HEPARIN SODIUM 5000 UNITS: 5000 INJECTION INTRAVENOUS; SUBCUTANEOUS at 22:19

## 2024-11-18 RX ADMIN — IPRATROPIUM BROMIDE AND ALBUTEROL SULFATE 1 DOSE: 2.5; .5 SOLUTION RESPIRATORY (INHALATION) at 00:39

## 2024-11-18 RX ADMIN — WATER 100 MG: 1 INJECTION INTRAMUSCULAR; INTRAVENOUS; SUBCUTANEOUS at 02:49

## 2024-11-18 RX ADMIN — SODIUM CHLORIDE 1500 MG: 9 INJECTION, SOLUTION INTRAVENOUS at 11:43

## 2024-11-18 RX ADMIN — IPRATROPIUM BROMIDE AND ALBUTEROL SULFATE 1 DOSE: 2.5; .5 SOLUTION RESPIRATORY (INHALATION) at 16:40

## 2024-11-18 RX ADMIN — WATER 2000 MG: 1 INJECTION INTRAMUSCULAR; INTRAVENOUS; SUBCUTANEOUS at 16:32

## 2024-11-18 RX ADMIN — DEXAMETHASONE SODIUM PHOSPHATE 6 MG: 10 INJECTION INTRAMUSCULAR; INTRAVENOUS at 11:35

## 2024-11-18 RX ADMIN — LACOSAMIDE 100 MG: 10 INJECTION INTRAVENOUS at 08:51

## 2024-11-18 RX ADMIN — Medication 25 MCG/HR: at 22:18

## 2024-11-18 RX ADMIN — PIPERACILLIN AND TAZOBACTAM 4500 MG: 4; .5 INJECTION, POWDER, FOR SOLUTION INTRAVENOUS at 05:53

## 2024-11-18 RX ADMIN — LACOSAMIDE 100 MG: 10 INJECTION INTRAVENOUS at 20:20

## 2024-11-18 RX ADMIN — INSULIN LISPRO 2 UNITS: 100 INJECTION, SOLUTION INTRAVENOUS; SUBCUTANEOUS at 06:02

## 2024-11-18 RX ADMIN — POLYVINYL ALCOHOL, POVIDONE 1 DROP: 14; 6 SOLUTION/ DROPS OPHTHALMIC at 08:47

## 2024-11-18 RX ADMIN — INSULIN GLARGINE 8 UNITS: 100 INJECTION, SOLUTION SUBCUTANEOUS at 11:34

## 2024-11-18 RX ADMIN — IPRATROPIUM BROMIDE AND ALBUTEROL SULFATE 1 DOSE: 2.5; .5 SOLUTION RESPIRATORY (INHALATION) at 04:12

## 2024-11-18 RX ADMIN — MINERAL OIL, WHITE PETROLATUM: .03; .94 OINTMENT OPHTHALMIC at 02:49

## 2024-11-18 RX ADMIN — IPRATROPIUM BROMIDE AND ALBUTEROL SULFATE 1 DOSE: 2.5; .5 SOLUTION RESPIRATORY (INHALATION) at 08:15

## 2024-11-18 RX ADMIN — HEPARIN SODIUM 5000 UNITS: 5000 INJECTION INTRAVENOUS; SUBCUTANEOUS at 05:47

## 2024-11-18 RX ADMIN — INSULIN LISPRO 2 UNITS: 100 INJECTION, SOLUTION INTRAVENOUS; SUBCUTANEOUS at 11:35

## 2024-11-18 RX ADMIN — SODIUM CHLORIDE: 4.5 INJECTION, SOLUTION INTRAVENOUS at 20:22

## 2024-11-18 RX ADMIN — POLYVINYL ALCOHOL, POVIDONE 1 DROP: 14; 6 SOLUTION/ DROPS OPHTHALMIC at 20:17

## 2024-11-18 RX ADMIN — WATER 100 MG: 1 INJECTION INTRAMUSCULAR; INTRAVENOUS; SUBCUTANEOUS at 08:46

## 2024-11-18 RX ADMIN — SODIUM CHLORIDE: 4.5 INJECTION, SOLUTION INTRAVENOUS at 13:35

## 2024-11-18 RX ADMIN — SODIUM CHLORIDE, PRESERVATIVE FREE 10 ML: 5 INJECTION INTRAVENOUS at 20:22

## 2024-11-18 RX ADMIN — CHLORHEXIDINE GLUCONATE, 0.12% ORAL RINSE 15 ML: 1.2 SOLUTION DENTAL at 20:17

## 2024-11-18 RX ADMIN — HEPARIN SODIUM 5000 UNITS: 5000 INJECTION INTRAVENOUS; SUBCUTANEOUS at 13:35

## 2024-11-18 RX ADMIN — POLYVINYL ALCOHOL, POVIDONE 1 DROP: 14; 6 SOLUTION/ DROPS OPHTHALMIC at 17:45

## 2024-11-18 RX ADMIN — MINERAL OIL, WHITE PETROLATUM: .03; .94 OINTMENT OPHTHALMIC at 08:47

## 2024-11-18 RX ADMIN — IPRATROPIUM BROMIDE AND ALBUTEROL SULFATE 1 DOSE: 2.5; .5 SOLUTION RESPIRATORY (INHALATION) at 19:42

## 2024-11-18 RX ADMIN — INSULIN LISPRO 4 UNITS: 100 INJECTION, SOLUTION INTRAVENOUS; SUBCUTANEOUS at 16:33

## 2024-11-18 RX ADMIN — BUDESONIDE 500 MCG: 0.5 SUSPENSION RESPIRATORY (INHALATION) at 19:43

## 2024-11-18 RX ADMIN — SODIUM CHLORIDE: 4.5 INJECTION, SOLUTION INTRAVENOUS at 05:45

## 2024-11-18 RX ADMIN — CHLORHEXIDINE GLUCONATE, 0.12% ORAL RINSE 15 ML: 1.2 SOLUTION DENTAL at 08:46

## 2024-11-18 RX ADMIN — LEVETIRACETAM 500 MG: 100 INJECTION INTRAVENOUS at 22:19

## 2024-11-18 RX ADMIN — POLYVINYL ALCOHOL, POVIDONE 1 DROP: 14; 6 SOLUTION/ DROPS OPHTHALMIC at 05:47

## 2024-11-18 ASSESSMENT — PULMONARY FUNCTION TESTS
PIF_VALUE: 21
PIF_VALUE: 20
PIF_VALUE: 21
PIF_VALUE: 22
PIF_VALUE: 21
PIF_VALUE: 20
PIF_VALUE: 22
PIF_VALUE: 23
PIF_VALUE: 22
PIF_VALUE: 21
PIF_VALUE: 21
PIF_VALUE: 22
PIF_VALUE: 21
PIF_VALUE: 23
PIF_VALUE: 21
PIF_VALUE: 22
PIF_VALUE: 19
PIF_VALUE: 19
PIF_VALUE: 21
PIF_VALUE: 21
PIF_VALUE: 22
PIF_VALUE: 40
PIF_VALUE: 22

## 2024-11-18 ASSESSMENT — PAIN SCALES - GENERAL
PAINLEVEL_OUTOF10: 0

## 2024-11-18 NOTE — CARE COORDINATION
Care Coordination LOS 3 day. To ER from [facility] for hypoxia, Covid positive. Intubated in ER, transfer to MICU.  Acute metabolic encephalopathy, bibasilar aspiration PNA, Septic shock, Hx dementia. Neurosurg consulted for bifrontal chronic SDH- no neuro intervention, guidelines for anticoagulation. Neuro following for AMS, abnormal Ceribell with risk for seizures- Mireya Eason, MRI brain completed.  Call to wife Isabella regarding transition of care.  Pt lives with her  in [place], [place].  Pt with hx of dementia, some agitation lately- mores recent-pt went to [facility] on Nov 3rd, walked home and fell- brought to ER. Was to start medication on pt, as he was refusing at home- wife agreed inpatient and was sent to [facility].  She states it was poor conditions there. He lost weight- not eating, fell and broke glasses and hit head and became covid positive- she was not notified of any of this until pt was sent to ER on 11/15/24 and she went to [place] for belongings- she refuses for him to return to [facility]. She has 4 daughters.  They have been speaking to [facility] with lock unit, vs home with daughters assisting.  She has  working on application for Medicaid. He was active with palliative care at home, but she does not recall agency. Currently intubated, Fentanyl, Versed, IVF@150, Zosyn.   Call to admissions at [facility] [phone].  They did not plan on accepting him d/o aggression and on antipsychotics- Spoke to Jenn and wife insists- can fax clinical to [phone] but likely will not accept.  Jenn states that Hospice has declined d/t behaviors [facility]- She was just contacted by hospice to discuss.  [facility] emailed regarding pt admission for [facility] per protocol Unclear of needs, will follow    Electronically signed by Anastasiya Klein RN on 11/18/2024 at 11:05 AM

## 2024-11-18 NOTE — FLOWSHEET NOTE
Patient remains intubated and sedated.  Will attempt to reach for lines and ett .  Bilateral soft wrist restraints continued for safety.

## 2024-11-18 NOTE — CONSULTS
[facility]              [address] [place] [address]                              CONSULTATION      PATIENT NAME: SANDRINE LEÓN                : 1945  MED REC NO: [id number]                        ROOM: [id number]  ACCOUNT NO: [id number]                       ADMIT DATE: 11/15/2024  PROVIDER: Nayely Astudillo MD    NEUROSURGERY CONSULT    CONSULT DATE: 11/15/2024      REASON FOR CONSULT:  Bilateral subdural hematomas.    HISTORY OF PRESENT ILLNESS:  The patient is a 79-year-old gentleman who presented to the emergency room with respiratory distress.  He was recently seen at a hospital in [place] as he is from [place].  He was treated for some traumatic injuries and was ultimately discharged to [facility].  He was brought from [facility] over because of respiratory distress and altered mental status.  Part of his workup included CT scan of his head that showed bilateral subdural hematomas for which Neurosurgery Service was consulted.  Interview and examination are difficult to complete because of the patient's current medical condition as he is currently intubated for airway protection and for his respiratory distress.    PAST MEDICAL HISTORY:  Positive for dementia, gastroesophageal reflux, diabetes, hypertension.    PAST SURGICAL HISTORY:  Unknown.    FAMILY HISTORY:  Unknown.    SOCIAL HISTORY:  Unknown.    REVIEW OF SYSTEMS:  I am unable to complete a 14-point review of systems because of the patient's current medical condition.    PHYSICAL EXAMINATION:  VITAL SIGNS:  He is currently afebrile with a T-current of 37.8 degrees Celsius, pulse 84, blood pressure is 95/57, respiratory rate is 21.  GENERAL:  He is resting in bed, is in respiratory distress and intubated for airway protection.  He does appear stated age.  HEENT:  His head is normocephalic and atraumatic.  Pupils are 3 to 2 mm and reactive.  He has no drainage out of his eyes, ears, 
  Palliative Care Department  [phone]  Palliative Care Initial Consult  Provider Aubree Reyes, APRN - CNP     Hany Walter  [id number]  Hospital Day: 4  Date of Initial Consult: 11/17/2024  Referring Provider:  Elieser Edwards MD  Palliative Medicine was consulted for assistance with: goals of care, overwhelming symptoms     HPI:   Hany Walter is a 79 y.o. with a medical history of GERD, pancytopenia, type 2 diabetes mellitus, alcohol use disorder, aphasia, hypertension, frontotemporal dementia who was admitted on 11/15/2024 from [facility] with a CHIEF COMPLAINT of Respiratory Distress, hypoxia, altered mental status.  The patient was intubated due to respiratory distress.  The patient was transferred to ICU for further medical management.  CT chest consistent with bibasilar pneumonia.  CT showed bilateral subdural hematoma.  Neurosurgery, nephrology, neurology consulted.  Palliative medicine consulted for further assistance.    ASSESSMENT/PLAN:     Pertinent Hospital Diagnoses     Acute metabolic encephalopathy  Seizures noted on EEG  Underlying frontotemporal severe dementia  Chronic alcohol use  Bilateral acute VS subacute subdural hematoma  Septic shock, growing MRSA in sputum and blood   Acute hypoxic respiratory failure, aspiration/COVID-19 pneumonia  Hypernatremia  Acute kidney injury on CKD  High anion gap metabolic acidosis  NSTEMI type II?   Multiple ecchymosis from previous fall      Palliative Care Encounter / Counseling Regarding Goals of Care  Please see detailed goals of care discussion as below  At this time, Hany Walter, Does Not have capacity for medical decision-making.  Capacity is time limited and situation/question specific  During encounter Isabella was surrogate medical decision-maker  Outcome of goals of care meeting:   Continue current medical management  Discussed CODE STATUS options  Hospice consult for information  Code status Full Code  Advanced Directives: no POA or living will 
[facility]  Neurology Consult    Date:  11/16/2024  Patient Name:  Hany Walter  YOB: 1945  MRN: [id number]     PCP:  No primary care provider on file.   Referring:  No ref. provider found      Chief Complaint: Altered mental status    History obtained from: Patient's spouse, staff, chart    Assessment  Hany Walter is a 79 y.o. male admitted with altered mental status in the setting of COVID-19, bilateral subdural hematomas, and abnormal Ceribell EEG with risk for seizures.       Plan  Reduce Keppra to 500 mg BID for renal dosing  Start Vimpat 100 mg BID  MRI brain w/o contrast  Will follow        History of Present Illness:  Hany Walter is a 79 y.o. right handed male presenting for evaluation of altered mental status.  [facility]  He was wife reports that he had been there for treatment but the unit had been on lockdown due to a COVID outbreak.  The patient was noted to be COVID-positive.  He was intubated and admitted to the medical intensive care unit.    She also reports he had had a fall in the last 1 to 2 weeks.  An outside CT head of facial bones done November 3 did not report any intracranial hemorrhage, however, repeat CT head was done November 15 which showed bilateral acute versus early subacute subdural hematomas.      Social History:  Social History     Tobacco Use    Smoking status: Never    Smokeless tobacco: Never   Vaping Use    Vaping status: Former        Current Medications:      Current Facility-Administered Medications   Medication Dose Route Frequency Provider Last Rate Last Admin    albumin human 25% IV solution 25 g  25 g IntraVENous Q6H Silver Contreras  mL/hr at 11/16/24 1050 25 g at 11/16/24 1050    vancomycin (VANCOCIN) 1,250 mg in sodium chloride 0.9 % 250 mL IVPB (Snlp5Cun)  1,250 mg IntraVENous Once Silver Contreras .7 mL/hr at 11/16/24 1104 1,250 mg at 11/16/24 
Critical Care Consult Note    Patient - Hany Walter   MRN -  [id number]   Acct # - [id number]   - 1945      Date of Admission -  11/15/2024  5:33 AM  Date of evaluation -  11/15/2024  4403/4403-A   Hospital Day - 0          ADMIT/CONSULT DETAILS     Reason for Admit/Consult   Acute hypoxic respiratory failure     Consulting Service/Physician   Consulting - Elieser Edwards MD  Primary Care Physician - No primary care provider on file.         HPI   78 yo M with PMX of Dementia, H/O Fall at [facility] few days ago presented with unresponsiveness & severe hypoxia in ER. He was intubated for acute hypoxic respiratory failure. On arrival to MICU he was found to be hypotensive & was started on levophed drip. CT chest is consistent with bibasilar PNA. CT head consistent with Bilateral subdural hematoma. Admitted in MICU for unresponsiveness & septic shock.         Past Medical History   No past medical history on file.     Past Surgical History     No past surgical history on file.      Current Medications   Current Medications    ipratropium 0.5 mg-albuterol 2.5 mg  2 Dose Inhalation Once    chlorhexidine  15 mL Mouth/Throat BID    pantoprazole (PROTONIX) 40 mg in sodium chloride (PF) 0.9 % 10 mL injection  40 mg IntraVENous Daily    heparin (porcine)  5,000 Units SubCUTAneous 3 times per day    hydrocortisone sodium succinate PF (SOLU-CORTEF) 100 mg in sterile water 2 mL injection  100 mg IntraVENous Q8H    piperacillin-tazobactam  4,500 mg IntraVENous Q12H    Polyvinyl Alcohol-Povidone PF  1 drop Both Eyes Q4H    And    artificial tears   Both Eyes Q4H    levETIRAcetam  1,000 mg IntraVENous Q12H     fentaNYL **AND** fentaNYL  IV Drips/Infusions   fentaNYL Stopped (11/15/24 1017)    norepinephrine 40 mcg/min (11/15/24 1224)    dexmedeTOMIDine (PRECEDEX) 1,000 mcg in sodium chloride 0.9 % 250 mL infusion      sodium bicarbonate 50 mEq in dextrose 5 % 500 mL infusion       Home 
1,000 mg, Q12H  sodium bicarbonate 75 mEq in dextrose 5 % 500 mL infusion, Continuous        Review of Systems:   Unable to obtain    Physical exam:  General Appearance:  critically ill, sedated and mechanically ventilated   Skin:  Skin color, texture, turgor normal. No rashes or lesions.  Eyes:  PERRL, EOMI, Sclera nonicteric, and Conjunctiva clear  Ears:  canals and TMs intact  Nose/Sinuses:  Nares normal. Septum midline. Mucosa normal. No drainage or sinus tenderness.  Mouth/Throat:  Mucosa moist.  No lesions.  ETT extends from oropharynx   Neck:  neck- supple, no mass, non-tender  Lungs:  Normal expansion.  Clear to auscultation.  No rales, rhonchi, or wheezing.  Heart:   Regular rate and rhythm without murmur, gallop or rub.  Abdomen:  Soft, non-tender, normal bowel sounds.  No bruits, organomegaly or masses.  Extremities: Extremities warm to touch, pink, with no edema.  Musculoskeletal:  No joint swelling, deformity, or tenderness.  Peripheral Pulses:  Normal  Neurologic:  sedated         Data:   Labs:  CBC with Differential:    Lab Results   Component Value Date/Time    WBC 27.3 11/15/2024 05:56 AM    RBC 4.65 11/15/2024 05:56 AM    HGB 13.7 11/15/2024 05:56 AM    HCT 44.3 11/15/2024 05:56 AM    MCV 95.3 11/15/2024 05:56 AM    MCH 29.5 11/15/2024 05:56 AM    MCHC 30.9 11/15/2024 05:56 AM    RDW 12.3 11/15/2024 05:56 AM    LYMPHOPCT 0 11/15/2024 05:56 AM    MONOPCT 7 11/15/2024 05:56 AM    EOSPCT 0 11/15/2024 05:56 AM    BASOPCT 0 11/15/2024 05:56 AM    MONOSABS 1.90 11/15/2024 05:56 AM    LYMPHSABS 0.00 11/15/2024 05:56 AM    EOSABS 0.00 11/15/2024 05:56 AM    BASOSABS 0.00 11/15/2024 05:56 AM     CMP:    Lab Results   Component Value Date/Time     11/15/2024 12:31 PM    K 4.3 11/15/2024 12:31 PM     11/15/2024 12:31 PM    CO2 18 11/15/2024 12:31 PM    BUN 74 11/15/2024 12:31 PM    CREATININE 3.6 11/15/2024 12:31 PM    LABGLOM 17 11/15/2024 12:31 PM    GLUCOSE 308 11/15/2024 12:31 PM    CALCIUM 
No pain in the lower abdomen  Extremities: No clubbing, no cyanosis, no edema.  Musculoskeletal: No pain in range of motion of any joints  Neurological: Following commands, no focal neurodeficit  Lines: peripheral      CBC+dif:  Recent Labs     11/16/24  0443 11/17/24  0416 11/18/24  0404   WBC 31.1* 30.4* 16.1*   HGB 9.6* 9.0* 8.2*   HCT 30.7* 28.3* 26.5*   MCV 95.6 94.0 94.0   PLT  --   --  87*   NEUTROABS 29.21* 29.07* PENDING     Lab Results   Component Value Date    .0 (H) 11/15/2024     No results found for: \"CRPHS\"  Lab Results   Component Value Date    SEDRATE 64 (H) 11/15/2024     Lab Results   Component Value Date    ALT 18 11/18/2024    AST 22 11/18/2024    ALKPHOS 61 11/18/2024    BILITOT 0.3 11/18/2024     Lab Results   Component Value Date/Time     11/18/2024 04:04 AM    K 4.5 11/18/2024 04:04 AM     11/18/2024 04:04 AM    CO2 19 11/18/2024 04:04 AM    BUN 60 11/18/2024 04:04 AM    CREATININE 1.8 11/18/2024 04:04 AM    LABGLOM 38 11/18/2024 04:04 AM    GLUCOSE 203 11/18/2024 04:04 AM    CALCIUM 8.3 11/18/2024 04:04 AM    BILITOT 0.3 11/18/2024 04:04 AM    ALKPHOS 61 11/18/2024 04:04 AM    AST 22 11/18/2024 04:04 AM    ALT 18 11/18/2024 04:04 AM       Lab Results   Component Value Date/Time    PROTIME 12.2 11/18/2024 04:04 AM    INR 1.1 11/18/2024 04:04 AM       Lab Results   Component Value Date/Time    TSH 0.74 11/15/2024 05:56 AM       No results found for: \"NITRITE\", \"COLORU\", \"PHUR\", \"LABCAST\", \"WBCUA\", \"RBCUA\", \"MUCUS\", \"TRICHOMONAS\", \"YEAST\", \"BACTERIA\", \"CLARITYU\", \"SPECGRAV\", \"LEUKOCYTESUR\", \"UROBILINOGEN\", \"BILIRUBINUR\", \"BLOODU\", \"GLUCOSEU\", \"AMORPHOUS\"    No results found for: \"SJI9NCT\", \"BEART\", \"F5ECEIFT\", \"PHART\", \"THGBART\", \"URT7XHL\", \"PO2ART\", \"JHA7TDG\"  Radiology:  XR CHEST PORTABLE   Final Result   Progression of right lower lobe infiltrate. Left lower lobe infiltrate is   unchanged.         MRI BRAIN WO CONTRAST   Final Result   1. Redemonstration of subacute 
atrophy or age related volume loss. ORBITS: The visualized portion of the orbits demonstrate no acute abnormality. SINUSES: The visualized paranasal sinuses and mastoid air cells demonstrate mastoid air cells clear with sinonasal disease or fluid may be associated with intubation. SOFT TISSUES/SKULL:  No acute abnormality of the visualized skull or soft tissues.     1. Bilateral acute vs early subacute subdural hematomas.  Measuring up to a maximum thickness 5 mm along the left lateral frontotemporal convexity.  No associated mass effect or intraventricular hemorrhage extension 2. Senescent changes with generalized atrophy and age related volume loss..     CTA PULMONARY W CONTRAST    Result Date: 11/15/2024  EXAMINATION: CTA OF THE CHEST 11/15/2024 6:47 am TECHNIQUE: CTA of the chest was performed after the administration of intravenous contrast.  Multiplanar reformatted images are provided for review.  MIP images are provided for review. Automated exposure control, iterative reconstruction, and/or weight based adjustment of the mA/kV was utilized to reduce the radiation dose to as low as reasonably achievable. COMPARISON: None. HISTORY: ORDERING SYSTEM PROVIDED HISTORY: hypoxia TECHNOLOGIST PROVIDED HISTORY: Reason for exam:->hypoxia Additional Contrast?->1 What reading provider will be dictating this exam?->CRC FINDINGS: Pulmonary Arteries: Pulmonary arteries are adequately opacified for evaluation.  No evidence of intraluminal filling defect to suggest pulmonary embolism.  Main pulmonary artery is normal in caliber. Mediastinum: Central airways are patent endotracheal tube in place terminating above the level the chantell.  No evidence of mediastinal lymphadenopathy.  The heart and pericardium demonstrate no acute abnormality. There is no acute abnormality of the thoracic aorta. Lungs/pleura: Opacifications greater portion lower lobes atelectasis versus airspace disease aspiration difficult to exclude without

## 2024-11-18 NOTE — ACP (ADVANCE CARE PLANNING)
Advance Care Planning   Healthcare Decision Maker:    Primary Decision Maker: Isabella Walter - Spouse - [phone]    Secondary Decision Maker: Javid Gil - Child - [phone]    Click here to complete Healthcare Decision Makers including selection of the Healthcare Decision Maker Relationship (ie \"Primary\").

## 2024-11-19 ENCOUNTER — APPOINTMENT (OUTPATIENT)
Dept: GENERAL RADIOLOGY | Age: 79
DRG: 870 | End: 2024-11-19
Payer: MEDICARE

## 2024-11-19 LAB
AADO2: 159.3 MMHG
ALBUMIN SERPL-MCNC: 2.5 G/DL (ref 3.5–5.2)
ALP SERPL-CCNC: 52 U/L (ref 40–129)
ALT SERPL-CCNC: 15 U/L (ref 0–40)
ANION GAP SERPL CALCULATED.3IONS-SCNC: 10 MMOL/L (ref 7–16)
ANION GAP SERPL CALCULATED.3IONS-SCNC: 7 MMOL/L (ref 7–16)
AST SERPL-CCNC: 15 U/L (ref 0–39)
B.E.: -4.1 MMOL/L (ref -3–3)
BASOPHILS # BLD: 0 K/UL (ref 0–0.2)
BASOPHILS # BLD: 0 K/UL (ref 0–0.2)
BASOPHILS NFR BLD: 0 % (ref 0–2)
BASOPHILS NFR BLD: 0 % (ref 0–2)
BILIRUB SERPL-MCNC: 0.2 MG/DL (ref 0–1.2)
BNP SERPL-MCNC: ABNORMAL PG/ML (ref 0–450)
BUN SERPL-MCNC: 57 MG/DL (ref 6–23)
BUN SERPL-MCNC: 59 MG/DL (ref 6–23)
CALCIUM SERPL-MCNC: 7.9 MG/DL (ref 8.6–10.2)
CALCIUM SERPL-MCNC: 8.3 MG/DL (ref 8.6–10.2)
CHLORIDE SERPL-SCNC: 117 MMOL/L (ref 98–107)
CHLORIDE SERPL-SCNC: 119 MMOL/L (ref 98–107)
CO2 SERPL-SCNC: 20 MMOL/L (ref 22–29)
CO2 SERPL-SCNC: 22 MMOL/L (ref 22–29)
COHB: 0.6 % (ref 0–1.5)
CREAT SERPL-MCNC: 1.3 MG/DL (ref 0.7–1.2)
CREAT SERPL-MCNC: 1.5 MG/DL (ref 0.7–1.2)
CRITICAL: ABNORMAL
CRP SERPL HS-MCNC: 61 MG/L (ref 0–5)
DATE ANALYZED: ABNORMAL
DATE LAST DOSE: NORMAL
DATE OF COLLECTION: ABNORMAL
EOSINOPHIL # BLD: 0 K/UL (ref 0.05–0.5)
EOSINOPHIL # BLD: 0 K/UL (ref 0.05–0.5)
EOSINOPHILS RELATIVE PERCENT: 0 % (ref 0–6)
EOSINOPHILS RELATIVE PERCENT: 0 % (ref 0–6)
ERYTHROCYTE [DISTWIDTH] IN BLOOD BY AUTOMATED COUNT: 12.2 % (ref 11.5–15)
ERYTHROCYTE [DISTWIDTH] IN BLOOD BY AUTOMATED COUNT: 12.3 % (ref 11.5–15)
FIO2: 40 %
GFR, ESTIMATED: 46 ML/MIN/1.73M2
GFR, ESTIMATED: 57 ML/MIN/1.73M2
GLUCOSE BLD-MCNC: 201 MG/DL (ref 74–99)
GLUCOSE BLD-MCNC: 231 MG/DL (ref 74–99)
GLUCOSE BLD-MCNC: 235 MG/DL (ref 74–99)
GLUCOSE BLD-MCNC: 302 MG/DL (ref 74–99)
GLUCOSE SERPL-MCNC: 249 MG/DL (ref 74–99)
GLUCOSE SERPL-MCNC: 281 MG/DL (ref 74–99)
HCO3: 19.1 MMOL/L (ref 22–26)
HCT VFR BLD AUTO: 24.8 % (ref 37–54)
HCT VFR BLD AUTO: 26.5 % (ref 37–54)
HGB BLD-MCNC: 7.6 G/DL (ref 12.5–16.5)
HGB BLD-MCNC: 8.2 G/DL (ref 12.5–16.5)
HHB: 3.1 % (ref 0–5)
IMM GRANULOCYTES # BLD AUTO: 0.33 K/UL (ref 0–0.58)
IMM GRANULOCYTES NFR BLD: 5 % (ref 0–5)
LAB: ABNORMAL
LYMPHOCYTES NFR BLD: 0.19 K/UL (ref 1.5–4)
LYMPHOCYTES NFR BLD: 0.64 K/UL (ref 1.5–4)
LYMPHOCYTES RELATIVE PERCENT: 3 % (ref 20–42)
LYMPHOCYTES RELATIVE PERCENT: 4 % (ref 20–42)
Lab: 420
MAGNESIUM SERPL-MCNC: 2.1 MG/DL (ref 1.6–2.6)
MCH RBC QN AUTO: 29.1 PG (ref 26–35)
MCH RBC QN AUTO: 29.6 PG (ref 26–35)
MCHC RBC AUTO-ENTMCNC: 30.6 G/DL (ref 32–34.5)
MCHC RBC AUTO-ENTMCNC: 30.9 G/DL (ref 32–34.5)
MCV RBC AUTO: 94 FL (ref 80–99.9)
MCV RBC AUTO: 96.5 FL (ref 80–99.9)
METHB: 0.6 % (ref 0–1.5)
MODE: AC
MONOCYTES NFR BLD: 0.79 K/UL (ref 0.1–0.95)
MONOCYTES NFR BLD: 0.97 K/UL (ref 0.1–0.95)
MONOCYTES NFR BLD: 12 % (ref 2–12)
MONOCYTES NFR BLD: 6 % (ref 2–12)
NEUTROPHILS NFR BLD: 81 % (ref 43–80)
NEUTROPHILS NFR BLD: 90 % (ref 43–80)
NEUTS SEG NFR BLD: 14.49 K/UL (ref 1.8–7.3)
NEUTS SEG NFR BLD: 5.37 K/UL (ref 1.8–7.3)
O2 SATURATION: 96.9 % (ref 92–98.5)
O2HB: 95.7 % (ref 94–97)
OPERATOR ID: ABNORMAL
PATIENT TEMP: 37 C
PCO2: 27.9 MMHG (ref 35–45)
PEEP/CPAP: 8 CMH2O
PFO2: 2.35 MMHG/%
PH BLOOD GAS: 7.45 (ref 7.35–7.45)
PHOSPHATE SERPL-MCNC: 2.7 MG/DL (ref 2.5–4.5)
PLATELET # BLD AUTO: 69 K/UL (ref 130–450)
PLATELET # BLD AUTO: 87 K/UL (ref 130–450)
PLATELET CONFIRMATION: NORMAL
PMV BLD AUTO: 12.2 FL (ref 7–12)
PMV BLD AUTO: 12.8 FL (ref 7–12)
PO2: 93.8 MMHG (ref 75–100)
POTASSIUM SERPL-SCNC: 4.1 MMOL/L (ref 3.5–5)
POTASSIUM SERPL-SCNC: 4.3 MMOL/L (ref 3.5–5)
PROCALCITONIN SERPL-MCNC: 21.1 NG/ML (ref 0–0.08)
PROT SERPL-MCNC: 4.7 G/DL (ref 6.4–8.3)
RBC # BLD AUTO: 2.57 M/UL (ref 3.8–5.8)
RBC # BLD AUTO: 2.82 M/UL (ref 3.8–5.8)
RI(T): 1.7
RR MECHANICAL: 18 B/MIN
SODIUM SERPL-SCNC: 146 MMOL/L (ref 132–146)
SODIUM SERPL-SCNC: 149 MMOL/L (ref 132–146)
SOURCE, BLOOD GAS: ABNORMAL
THB: 8.4 G/DL (ref 11.5–16.5)
TIME ANALYZED: 425
TME LAST DOSE: NORMAL H
VANCOMYCIN DOSE: NORMAL MG
VANCOMYCIN SERPL-MCNC: 17.3 UG/ML (ref 5–40)
VT MECHANICAL: 450 ML
WBC OTHER # BLD: 16.1 K/UL (ref 4.5–11.5)
WBC OTHER # BLD: 6.7 K/UL (ref 4.5–11.5)

## 2024-11-19 PROCEDURE — 2580000003 HC RX 258: Performed by: INTERNAL MEDICINE

## 2024-11-19 PROCEDURE — 6370000000 HC RX 637 (ALT 250 FOR IP): Performed by: INTERNAL MEDICINE

## 2024-11-19 PROCEDURE — 99291 CRITICAL CARE FIRST HOUR: CPT | Performed by: INTERNAL MEDICINE

## 2024-11-19 PROCEDURE — 86140 C-REACTIVE PROTEIN: CPT

## 2024-11-19 PROCEDURE — 80202 ASSAY OF VANCOMYCIN: CPT

## 2024-11-19 PROCEDURE — 94003 VENT MGMT INPAT SUBQ DAY: CPT

## 2024-11-19 PROCEDURE — 2709999900 HC NON-CHARGEABLE SUPPLY

## 2024-11-19 PROCEDURE — 80048 BASIC METABOLIC PNL TOTAL CA: CPT

## 2024-11-19 PROCEDURE — 2000000000 HC ICU R&B

## 2024-11-19 PROCEDURE — 99231 SBSQ HOSP IP/OBS SF/LOW 25: CPT | Performed by: INTERNAL MEDICINE

## 2024-11-19 PROCEDURE — C9254 INJECTION, LACOSAMIDE: HCPCS | Performed by: PSYCHIATRY & NEUROLOGY

## 2024-11-19 PROCEDURE — 76937 US GUIDE VASCULAR ACCESS: CPT

## 2024-11-19 PROCEDURE — 6360000002 HC RX W HCPCS: Performed by: INTERNAL MEDICINE

## 2024-11-19 PROCEDURE — C1751 CATH, INF, PER/CENT/MIDLINE: HCPCS

## 2024-11-19 PROCEDURE — 05HB33Z INSERTION OF INFUSION DEVICE INTO RIGHT BASILIC VEIN, PERCUTANEOUS APPROACH: ICD-10-PCS | Performed by: INTERNAL MEDICINE

## 2024-11-19 PROCEDURE — P9047 ALBUMIN (HUMAN), 25%, 50ML: HCPCS

## 2024-11-19 PROCEDURE — 84145 PROCALCITONIN (PCT): CPT

## 2024-11-19 PROCEDURE — 84100 ASSAY OF PHOSPHORUS: CPT

## 2024-11-19 PROCEDURE — 99231 SBSQ HOSP IP/OBS SF/LOW 25: CPT | Performed by: NURSE PRACTITIONER

## 2024-11-19 PROCEDURE — 94640 AIRWAY INHALATION TREATMENT: CPT

## 2024-11-19 PROCEDURE — 6360000002 HC RX W HCPCS: Performed by: PSYCHIATRY & NEUROLOGY

## 2024-11-19 PROCEDURE — 71045 X-RAY EXAM CHEST 1 VIEW: CPT

## 2024-11-19 PROCEDURE — 6360000002 HC RX W HCPCS: Performed by: NURSE PRACTITIONER

## 2024-11-19 PROCEDURE — 83735 ASSAY OF MAGNESIUM: CPT

## 2024-11-19 PROCEDURE — 85025 COMPLETE CBC W/AUTO DIFF WBC: CPT

## 2024-11-19 PROCEDURE — 6360000002 HC RX W HCPCS

## 2024-11-19 PROCEDURE — 36569 INSJ PICC 5 YR+ W/O IMAGING: CPT

## 2024-11-19 PROCEDURE — 2580000003 HC RX 258: Performed by: PSYCHIATRY & NEUROLOGY

## 2024-11-19 PROCEDURE — 82805 BLOOD GASES W/O2 SATURATION: CPT

## 2024-11-19 PROCEDURE — 80053 COMPREHEN METABOLIC PANEL: CPT

## 2024-11-19 PROCEDURE — 83880 ASSAY OF NATRIURETIC PEPTIDE: CPT

## 2024-11-19 PROCEDURE — 82947 ASSAY GLUCOSE BLOOD QUANT: CPT

## 2024-11-19 RX ORDER — INSULIN GLARGINE 100 [IU]/ML
12 INJECTION, SOLUTION SUBCUTANEOUS DAILY
Status: DISCONTINUED | OUTPATIENT
Start: 2024-11-20 | End: 2024-11-20 | Stop reason: HOSPADM

## 2024-11-19 RX ORDER — POLYETHYLENE GLYCOL 3350 17 G/17G
17 POWDER, FOR SOLUTION ORAL DAILY
Status: DISCONTINUED | OUTPATIENT
Start: 2024-11-19 | End: 2024-11-19

## 2024-11-19 RX ORDER — ALBUMIN (HUMAN) 12.5 G/50ML
25 SOLUTION INTRAVENOUS ONCE
Status: COMPLETED | OUTPATIENT
Start: 2024-11-19 | End: 2024-11-19

## 2024-11-19 RX ADMIN — CHLORHEXIDINE GLUCONATE, 0.12% ORAL RINSE 15 ML: 1.2 SOLUTION DENTAL at 08:08

## 2024-11-19 RX ADMIN — IPRATROPIUM BROMIDE AND ALBUTEROL SULFATE 1 DOSE: 2.5; .5 SOLUTION RESPIRATORY (INHALATION) at 11:23

## 2024-11-19 RX ADMIN — SODIUM CHLORIDE, PRESERVATIVE FREE 10 ML: 5 INJECTION INTRAVENOUS at 20:19

## 2024-11-19 RX ADMIN — MODAFINIL 200 MG: 100 TABLET ORAL at 08:09

## 2024-11-19 RX ADMIN — LACOSAMIDE 100 MG: 10 INJECTION INTRAVENOUS at 08:12

## 2024-11-19 RX ADMIN — SODIUM CHLORIDE 1500 MG: 9 INJECTION, SOLUTION INTRAVENOUS at 14:37

## 2024-11-19 RX ADMIN — POLYVINYL ALCOHOL, POVIDONE 1 DROP: 14; 6 SOLUTION/ DROPS OPHTHALMIC at 08:19

## 2024-11-19 RX ADMIN — BUDESONIDE 500 MCG: 0.5 SUSPENSION RESPIRATORY (INHALATION) at 19:22

## 2024-11-19 RX ADMIN — DEXAMETHASONE SODIUM PHOSPHATE 6 MG: 10 INJECTION INTRAMUSCULAR; INTRAVENOUS at 08:09

## 2024-11-19 RX ADMIN — INSULIN GLARGINE 8 UNITS: 100 INJECTION, SOLUTION SUBCUTANEOUS at 08:08

## 2024-11-19 RX ADMIN — INSULIN LISPRO 2 UNITS: 100 INJECTION, SOLUTION INTRAVENOUS; SUBCUTANEOUS at 10:49

## 2024-11-19 RX ADMIN — SODIUM CHLORIDE: 4.5 INJECTION, SOLUTION INTRAVENOUS at 04:11

## 2024-11-19 RX ADMIN — SODIUM CHLORIDE, PRESERVATIVE FREE 40 MG: 5 INJECTION INTRAVENOUS at 08:09

## 2024-11-19 RX ADMIN — IPRATROPIUM BROMIDE AND ALBUTEROL SULFATE 1 DOSE: 2.5; .5 SOLUTION RESPIRATORY (INHALATION) at 16:04

## 2024-11-19 RX ADMIN — INSULIN LISPRO 6 UNITS: 100 INJECTION, SOLUTION INTRAVENOUS; SUBCUTANEOUS at 16:51

## 2024-11-19 RX ADMIN — MINERAL OIL, WHITE PETROLATUM: .03; .94 OINTMENT OPHTHALMIC at 16:51

## 2024-11-19 RX ADMIN — LEVETIRACETAM 500 MG: 100 INJECTION INTRAVENOUS at 10:47

## 2024-11-19 RX ADMIN — IPRATROPIUM BROMIDE AND ALBUTEROL SULFATE 1 DOSE: 2.5; .5 SOLUTION RESPIRATORY (INHALATION) at 08:11

## 2024-11-19 RX ADMIN — SODIUM CHLORIDE: 4.5 INJECTION, SOLUTION INTRAVENOUS at 00:28

## 2024-11-19 RX ADMIN — INSULIN LISPRO 2 UNITS: 100 INJECTION, SOLUTION INTRAVENOUS; SUBCUTANEOUS at 06:11

## 2024-11-19 RX ADMIN — POLYVINYL ALCOHOL, POVIDONE 1 DROP: 14; 6 SOLUTION/ DROPS OPHTHALMIC at 16:51

## 2024-11-19 RX ADMIN — BUDESONIDE 500 MCG: 0.5 SUSPENSION RESPIRATORY (INHALATION) at 08:11

## 2024-11-19 RX ADMIN — LACOSAMIDE 100 MG: 10 INJECTION INTRAVENOUS at 20:25

## 2024-11-19 RX ADMIN — MINERAL OIL, WHITE PETROLATUM: .03; .94 OINTMENT OPHTHALMIC at 08:20

## 2024-11-19 RX ADMIN — HEPARIN SODIUM 5000 UNITS: 5000 INJECTION INTRAVENOUS; SUBCUTANEOUS at 20:19

## 2024-11-19 RX ADMIN — POLYVINYL ALCOHOL, POVIDONE 1 DROP: 14; 6 SOLUTION/ DROPS OPHTHALMIC at 20:19

## 2024-11-19 RX ADMIN — POLYVINYL ALCOHOL, POVIDONE 1 DROP: 14; 6 SOLUTION/ DROPS OPHTHALMIC at 06:11

## 2024-11-19 RX ADMIN — LEVETIRACETAM 500 MG: 100 INJECTION INTRAVENOUS at 21:21

## 2024-11-19 RX ADMIN — MINERAL OIL, WHITE PETROLATUM: .03; .94 OINTMENT OPHTHALMIC at 14:38

## 2024-11-19 RX ADMIN — CHLORHEXIDINE GLUCONATE, 0.12% ORAL RINSE 15 ML: 1.2 SOLUTION DENTAL at 20:19

## 2024-11-19 RX ADMIN — HEPARIN SODIUM 5000 UNITS: 5000 INJECTION INTRAVENOUS; SUBCUTANEOUS at 14:32

## 2024-11-19 RX ADMIN — IPRATROPIUM BROMIDE AND ALBUTEROL SULFATE 1 DOSE: 2.5; .5 SOLUTION RESPIRATORY (INHALATION) at 19:22

## 2024-11-19 RX ADMIN — POLYVINYL ALCOHOL, POVIDONE 1 DROP: 14; 6 SOLUTION/ DROPS OPHTHALMIC at 03:00

## 2024-11-19 RX ADMIN — SODIUM CHLORIDE, PRESERVATIVE FREE 10 ML: 5 INJECTION INTRAVENOUS at 08:09

## 2024-11-19 RX ADMIN — INSULIN LISPRO 2 UNITS: 100 INJECTION, SOLUTION INTRAVENOUS; SUBCUTANEOUS at 21:21

## 2024-11-19 RX ADMIN — HEPARIN SODIUM 5000 UNITS: 5000 INJECTION INTRAVENOUS; SUBCUTANEOUS at 06:11

## 2024-11-19 RX ADMIN — WATER 2000 MG: 1 INJECTION INTRAMUSCULAR; INTRAVENOUS; SUBCUTANEOUS at 16:47

## 2024-11-19 RX ADMIN — POLYVINYL ALCOHOL, POVIDONE 1 DROP: 14; 6 SOLUTION/ DROPS OPHTHALMIC at 14:38

## 2024-11-19 RX ADMIN — ALBUMIN (HUMAN) 25 G: 0.25 INJECTION, SOLUTION INTRAVENOUS at 06:20

## 2024-11-19 ASSESSMENT — PAIN SCALES - GENERAL
PAINLEVEL_OUTOF10: 0

## 2024-11-19 ASSESSMENT — PULMONARY FUNCTION TESTS
PIF_VALUE: 23
PIF_VALUE: 24
PIF_VALUE: 23
PIF_VALUE: 23
PIF_VALUE: 20
PIF_VALUE: 20
PIF_VALUE: 23
PIF_VALUE: 19
PIF_VALUE: 20
PIF_VALUE: 20
PIF_VALUE: 23
PIF_VALUE: 20
PIF_VALUE: 23
PIF_VALUE: 23
PIF_VALUE: 24
PIF_VALUE: 21
PIF_VALUE: 20
PIF_VALUE: 20
PIF_VALUE: 23
PIF_VALUE: 21
PIF_VALUE: 23
PIF_VALUE: 21
PIF_VALUE: 21
PIF_VALUE: 20
PIF_VALUE: 18
PIF_VALUE: 23
PIF_VALUE: 24
PIF_VALUE: 21
PIF_VALUE: 23

## 2024-11-19 NOTE — CARE COORDINATION
Care Coordination: LOS 4 day. Palliative care met with wife at bedside and daughters. They would like to speak to hospice for information. They have no preference. [facility] consulted, called in and placed in epic.    Electronically signed by Anastasiya Klein RN on 11/19/2024 at 2:30 PM

## 2024-11-20 ENCOUNTER — APPOINTMENT (OUTPATIENT)
Dept: GENERAL RADIOLOGY | Age: 79
DRG: 870 | End: 2024-11-20
Payer: MEDICARE

## 2024-11-20 VITALS
BODY MASS INDEX: 30.64 KG/M2 | WEIGHT: 202.16 LBS | DIASTOLIC BLOOD PRESSURE: 75 MMHG | OXYGEN SATURATION: 100 % | HEIGHT: 68 IN | RESPIRATION RATE: 18 BRPM | TEMPERATURE: 99.3 F | HEART RATE: 67 BPM | SYSTOLIC BLOOD PRESSURE: 120 MMHG

## 2024-11-20 LAB
AADO2: 158.8 MMHG
ACB COMPLEX DNA BLD POS QL NAA+NON-PROBE: NOT DETECTED
ALBUMIN SERPL-MCNC: 3 G/DL (ref 3.5–5.2)
ALP SERPL-CCNC: 55 U/L (ref 40–129)
ALT SERPL-CCNC: 18 U/L (ref 0–40)
ANION GAP SERPL CALCULATED.3IONS-SCNC: 8 MMOL/L (ref 7–16)
AST SERPL-CCNC: 21 U/L (ref 0–39)
B FRAGILIS DNA BLD POS QL NAA+NON-PROBE: NOT DETECTED
B.E.: -2.7 MMOL/L (ref -3–3)
BASOPHILS # BLD: 0 K/UL (ref 0–0.2)
BASOPHILS NFR BLD: 0 % (ref 0–2)
BILIRUB SERPL-MCNC: 0.3 MG/DL (ref 0–1.2)
BIOFIRE TEST COMMENT: ABNORMAL
BNP SERPL-MCNC: ABNORMAL PG/ML (ref 0–450)
BUN SERPL-MCNC: 52 MG/DL (ref 6–23)
C ALBICANS DNA BLD POS QL NAA+NON-PROBE: NOT DETECTED
C AURIS DNA BLD POS QL NAA+NON-PROBE: NOT DETECTED
C GATTII+NEOFOR DNA BLD POS QL NAA+N-PRB: NOT DETECTED
C GLABRATA DNA BLD POS QL NAA+NON-PROBE: NOT DETECTED
C KRUSEI DNA BLD POS QL NAA+NON-PROBE: NOT DETECTED
C PARAP DNA BLD POS QL NAA+NON-PROBE: NOT DETECTED
C TROPICLS DNA BLD POS QL NAA+NON-PROBE: NOT DETECTED
CALCIUM SERPL-MCNC: 8.4 MG/DL (ref 8.6–10.2)
CHLORIDE SERPL-SCNC: 121 MMOL/L (ref 98–107)
CO2 SERPL-SCNC: 22 MMOL/L (ref 22–29)
COHB: 0.4 % (ref 0–1.5)
CREAT SERPL-MCNC: 1.3 MG/DL (ref 0.7–1.2)
CRITICAL: ABNORMAL
CRP SERPL HS-MCNC: 39 MG/L (ref 0–5)
DATE ANALYZED: ABNORMAL
DATE OF COLLECTION: ABNORMAL
E CLOAC COMP DNA BLD POS NAA+NON-PROBE: NOT DETECTED
E COLI DNA BLD POS QL NAA+NON-PROBE: NOT DETECTED
E FAECALIS DNA BLD POS QL NAA+NON-PROBE: NOT DETECTED
E FAECIUM DNA BLD POS QL NAA+NON-PROBE: NOT DETECTED
ENTEROBACTERALES DNA BLD POS NAA+N-PRB: NOT DETECTED
EOSINOPHIL # BLD: 0 K/UL (ref 0.05–0.5)
EOSINOPHILS RELATIVE PERCENT: 0 % (ref 0–6)
ERYTHROCYTE [DISTWIDTH] IN BLOOD BY AUTOMATED COUNT: 11.9 % (ref 11.5–15)
FIO2: 40 %
GFR, ESTIMATED: 57 ML/MIN/1.73M2
GLUCOSE BLD-MCNC: 217 MG/DL (ref 74–99)
GLUCOSE BLD-MCNC: 233 MG/DL (ref 74–99)
GLUCOSE SERPL-MCNC: 178 MG/DL (ref 74–99)
GP B STREP DNA BLD POS QL NAA+NON-PROBE: NOT DETECTED
HAEM INFLU DNA BLD POS QL NAA+NON-PROBE: NOT DETECTED
HCO3: 19.8 MMOL/L (ref 22–26)
HCT VFR BLD AUTO: 26.4 % (ref 37–54)
HGB BLD-MCNC: 8.1 G/DL (ref 12.5–16.5)
HHB: 2.9 % (ref 0–5)
K OXYTOCA DNA BLD POS QL NAA+NON-PROBE: NOT DETECTED
KLEBSIELLA SP DNA BLD POS QL NAA+NON-PRB: NOT DETECTED
KLEBSIELLA SP DNA BLD POS QL NAA+NON-PRB: NOT DETECTED
L MONOCYTOG DNA BLD POS QL NAA+NON-PROBE: NOT DETECTED
LAB: ABNORMAL
LYMPHOCYTES NFR BLD: 0.3 K/UL (ref 1.5–4)
LYMPHOCYTES RELATIVE PERCENT: 4 % (ref 20–42)
Lab: 430
MAGNESIUM SERPL-MCNC: 2 MG/DL (ref 1.6–2.6)
MCH RBC QN AUTO: 29.1 PG (ref 26–35)
MCHC RBC AUTO-ENTMCNC: 30.7 G/DL (ref 32–34.5)
MCV RBC AUTO: 95 FL (ref 80–99.9)
MECA+MECC+MREJ ISLT/SPM QL: NOT DETECTED
METAMYELOCYTES ABSOLUTE COUNT: 0.12 K/UL (ref 0–0.12)
METAMYELOCYTES: 2 % (ref 0–1)
METHB: 0.6 % (ref 0–1.5)
MICROORGANISM SPEC CULT: ABNORMAL
MICROORGANISM/AGENT SPEC: ABNORMAL
MODE: AC
MONOCYTES NFR BLD: 0.83 K/UL (ref 0.1–0.95)
MONOCYTES NFR BLD: 12 % (ref 2–12)
MYELOCYTES ABSOLUTE COUNT: 0.24 K/UL
MYELOCYTES: 4 %
N MEN DNA BLD POS QL NAA+NON-PROBE: NOT DETECTED
NEUTROPHILS NFR BLD: 78 % (ref 43–80)
NEUTS SEG NFR BLD: 5.32 K/UL (ref 1.8–7.3)
O2 SATURATION: 97.1 % (ref 92–98.5)
O2HB: 96.1 % (ref 94–97)
OPERATOR ID: ABNORMAL
P AERUGINOSA DNA BLD POS NAA+NON-PROBE: NOT DETECTED
PATIENT TEMP: 37 C
PCO2: 26.4 MMHG (ref 35–45)
PEEP/CPAP: 10 CMH2O
PFO2: 2.4 MMHG/%
PH BLOOD GAS: 7.49 (ref 7.35–7.45)
PHOSPHATE SERPL-MCNC: 2 MG/DL (ref 2.5–4.5)
PLATELET # BLD AUTO: 81 K/UL (ref 130–450)
PLATELET CONFIRMATION: NORMAL
PMV BLD AUTO: 12.7 FL (ref 7–12)
PO2: 96 MMHG (ref 75–100)
POTASSIUM SERPL-SCNC: 3.8 MMOL/L (ref 3.5–5)
PROCALCITONIN SERPL-MCNC: 9 NG/ML (ref 0–0.08)
PROT SERPL-MCNC: 5.1 G/DL (ref 6.4–8.3)
PROTEUS SP DNA BLD POS QL NAA+NON-PROBE: NOT DETECTED
RBC # BLD AUTO: 2.78 M/UL (ref 3.8–5.8)
RBC # BLD: ABNORMAL 10*6/UL
RI(T): 1.65
RR MECHANICAL: 18 B/MIN
S AUREUS DNA BLD POS QL NAA+NON-PROBE: DETECTED
S AUREUS+CONS DNA BLD POS NAA+NON-PROBE: DETECTED
S EPIDERMIDIS DNA BLD POS QL NAA+NON-PRB: NOT DETECTED
S LUGDUNENSIS DNA BLD POS QL NAA+NON-PRB: NOT DETECTED
S MALTOPHILIA DNA BLD POS QL NAA+NON-PRB: NOT DETECTED
S MARCESCENS DNA BLD POS NAA+NON-PROBE: NOT DETECTED
S PNEUM DNA BLD POS QL NAA+NON-PROBE: NOT DETECTED
S PYO DNA BLD POS QL NAA+NON-PROBE: NOT DETECTED
SALMONELLA DNA BLD POS QL NAA+NON-PROBE: NOT DETECTED
SERVICE CMNT-IMP: ABNORMAL
SERVICE CMNT-IMP: ABNORMAL
SODIUM SERPL-SCNC: 151 MMOL/L (ref 132–146)
SOURCE, BLOOD GAS: ABNORMAL
SPECIMEN DESCRIPTION: ABNORMAL
SPECIMEN DESCRIPTION: ABNORMAL
STREPTOCOCCUS DNA BLD POS NAA+NON-PROBE: DETECTED
THB: 9.1 G/DL (ref 11.5–16.5)
TIME ANALYZED: 439
VT MECHANICAL: 450 ML
WBC OTHER # BLD: 6.8 K/UL (ref 4.5–11.5)

## 2024-11-20 PROCEDURE — 71045 X-RAY EXAM CHEST 1 VIEW: CPT

## 2024-11-20 PROCEDURE — 6360000002 HC RX W HCPCS: Performed by: NURSE PRACTITIONER

## 2024-11-20 PROCEDURE — 6360000002 HC RX W HCPCS: Performed by: PSYCHIATRY & NEUROLOGY

## 2024-11-20 PROCEDURE — 83880 ASSAY OF NATRIURETIC PEPTIDE: CPT

## 2024-11-20 PROCEDURE — 82947 ASSAY GLUCOSE BLOOD QUANT: CPT

## 2024-11-20 PROCEDURE — 2580000003 HC RX 258: Performed by: INTERNAL MEDICINE

## 2024-11-20 PROCEDURE — 99238 HOSP IP/OBS DSCHRG MGMT 30/<: CPT | Performed by: INTERNAL MEDICINE

## 2024-11-20 PROCEDURE — 94003 VENT MGMT INPAT SUBQ DAY: CPT

## 2024-11-20 PROCEDURE — 6370000000 HC RX 637 (ALT 250 FOR IP): Performed by: INTERNAL MEDICINE

## 2024-11-20 PROCEDURE — 83735 ASSAY OF MAGNESIUM: CPT

## 2024-11-20 PROCEDURE — 94799 UNLISTED PULMONARY SVC/PX: CPT

## 2024-11-20 PROCEDURE — 2580000003 HC RX 258

## 2024-11-20 PROCEDURE — 2500000003 HC RX 250 WO HCPCS

## 2024-11-20 PROCEDURE — 80053 COMPREHEN METABOLIC PANEL: CPT

## 2024-11-20 PROCEDURE — 84145 PROCALCITONIN (PCT): CPT

## 2024-11-20 PROCEDURE — 85025 COMPLETE CBC W/AUTO DIFF WBC: CPT

## 2024-11-20 PROCEDURE — 6360000002 HC RX W HCPCS: Performed by: INTERNAL MEDICINE

## 2024-11-20 PROCEDURE — 84100 ASSAY OF PHOSPHORUS: CPT

## 2024-11-20 PROCEDURE — C9254 INJECTION, LACOSAMIDE: HCPCS | Performed by: PSYCHIATRY & NEUROLOGY

## 2024-11-20 PROCEDURE — 82805 BLOOD GASES W/O2 SATURATION: CPT

## 2024-11-20 PROCEDURE — 2580000003 HC RX 258: Performed by: PSYCHIATRY & NEUROLOGY

## 2024-11-20 PROCEDURE — 94640 AIRWAY INHALATION TREATMENT: CPT

## 2024-11-20 PROCEDURE — 86140 C-REACTIVE PROTEIN: CPT

## 2024-11-20 PROCEDURE — 99291 CRITICAL CARE FIRST HOUR: CPT | Performed by: INTERNAL MEDICINE

## 2024-11-20 RX ORDER — BUMETANIDE 0.25 MG/ML
2 INJECTION, SOLUTION INTRAMUSCULAR; INTRAVENOUS ONCE
Status: COMPLETED | OUTPATIENT
Start: 2024-11-20 | End: 2024-11-20

## 2024-11-20 RX ORDER — LORAZEPAM 2 MG/ML
1 INJECTION INTRAMUSCULAR EVERY 4 HOURS PRN
Status: DISCONTINUED | OUTPATIENT
Start: 2024-11-20 | End: 2024-11-20 | Stop reason: HOSPADM

## 2024-11-20 RX ORDER — DEXTROSE MONOHYDRATE 50 MG/ML
INJECTION, SOLUTION INTRAVENOUS CONTINUOUS
Status: DISCONTINUED | OUTPATIENT
Start: 2024-11-20 | End: 2024-11-20 | Stop reason: HOSPADM

## 2024-11-20 RX ADMIN — IPRATROPIUM BROMIDE AND ALBUTEROL SULFATE 1 DOSE: 2.5; .5 SOLUTION RESPIRATORY (INHALATION) at 12:39

## 2024-11-20 RX ADMIN — HEPARIN SODIUM 5000 UNITS: 5000 INJECTION INTRAVENOUS; SUBCUTANEOUS at 21:09

## 2024-11-20 RX ADMIN — LEVETIRACETAM 500 MG: 100 INJECTION INTRAVENOUS at 10:51

## 2024-11-20 RX ADMIN — LACOSAMIDE 100 MG: 10 INJECTION INTRAVENOUS at 21:27

## 2024-11-20 RX ADMIN — LEVETIRACETAM 500 MG: 100 INJECTION INTRAVENOUS at 22:24

## 2024-11-20 RX ADMIN — POLYVINYL ALCOHOL, POVIDONE 1 DROP: 14; 6 SOLUTION/ DROPS OPHTHALMIC at 06:00

## 2024-11-20 RX ADMIN — MODAFINIL 200 MG: 100 TABLET ORAL at 08:28

## 2024-11-20 RX ADMIN — LACOSAMIDE 100 MG: 10 INJECTION INTRAVENOUS at 08:27

## 2024-11-20 RX ADMIN — IPRATROPIUM BROMIDE AND ALBUTEROL SULFATE 1 DOSE: 2.5; .5 SOLUTION RESPIRATORY (INHALATION) at 16:47

## 2024-11-20 RX ADMIN — INSULIN GLARGINE 12 UNITS: 100 INJECTION, SOLUTION SUBCUTANEOUS at 08:40

## 2024-11-20 RX ADMIN — SODIUM CHLORIDE, PRESERVATIVE FREE 10 ML: 5 INJECTION INTRAVENOUS at 08:29

## 2024-11-20 RX ADMIN — HEPARIN SODIUM 5000 UNITS: 5000 INJECTION INTRAVENOUS; SUBCUTANEOUS at 06:00

## 2024-11-20 RX ADMIN — INSULIN LISPRO 2 UNITS: 100 INJECTION, SOLUTION INTRAVENOUS; SUBCUTANEOUS at 10:54

## 2024-11-20 RX ADMIN — DEXTROSE MONOHYDRATE: 50 INJECTION, SOLUTION INTRAVENOUS at 06:50

## 2024-11-20 RX ADMIN — IPRATROPIUM BROMIDE AND ALBUTEROL SULFATE 1 DOSE: 2.5; .5 SOLUTION RESPIRATORY (INHALATION) at 08:34

## 2024-11-20 RX ADMIN — CHLORHEXIDINE GLUCONATE, 0.12% ORAL RINSE 15 ML: 1.2 SOLUTION DENTAL at 08:29

## 2024-11-20 RX ADMIN — HYDROMORPHONE HYDROCHLORIDE 0.25 MG: 1 INJECTION, SOLUTION INTRAMUSCULAR; INTRAVENOUS; SUBCUTANEOUS at 22:24

## 2024-11-20 RX ADMIN — MINERAL OIL, WHITE PETROLATUM: .03; .94 OINTMENT OPHTHALMIC at 02:06

## 2024-11-20 RX ADMIN — LORAZEPAM 1 MG: 2 INJECTION INTRAMUSCULAR; INTRAVENOUS at 22:24

## 2024-11-20 RX ADMIN — BUDESONIDE 500 MCG: 0.5 SUSPENSION RESPIRATORY (INHALATION) at 08:34

## 2024-11-20 RX ADMIN — POTASSIUM PHOSPHATE, MONOBASIC AND POTASSIUM PHOSPHATE, DIBASIC 10 MMOL: 224; 236 INJECTION, SOLUTION, CONCENTRATE INTRAVENOUS at 08:22

## 2024-11-20 RX ADMIN — DEXAMETHASONE SODIUM PHOSPHATE 6 MG: 10 INJECTION INTRAMUSCULAR; INTRAVENOUS at 08:28

## 2024-11-20 RX ADMIN — BUMETANIDE 2 MG: 0.25 INJECTION INTRAMUSCULAR; INTRAVENOUS at 10:50

## 2024-11-20 RX ADMIN — CHLORHEXIDINE GLUCONATE, 0.12% ORAL RINSE 15 ML: 1.2 SOLUTION DENTAL at 21:08

## 2024-11-20 RX ADMIN — SODIUM CHLORIDE, PRESERVATIVE FREE 40 MG: 5 INJECTION INTRAVENOUS at 08:28

## 2024-11-20 RX ADMIN — POLYVINYL ALCOHOL, POVIDONE 1 DROP: 14; 6 SOLUTION/ DROPS OPHTHALMIC at 21:08

## 2024-11-20 RX ADMIN — POLYVINYL ALCOHOL, POVIDONE 1 DROP: 14; 6 SOLUTION/ DROPS OPHTHALMIC at 10:58

## 2024-11-20 RX ADMIN — SODIUM CHLORIDE, PRESERVATIVE FREE 10 ML: 5 INJECTION INTRAVENOUS at 21:27

## 2024-11-20 ASSESSMENT — PULMONARY FUNCTION TESTS
PIF_VALUE: 17
PIF_VALUE: 19
PIF_VALUE: 17
PIF_VALUE: 22
PIF_VALUE: 21
PIF_VALUE: 21
PIF_VALUE: 17
PIF_VALUE: 22
PIF_VALUE: 22
PIF_VALUE: 21
PIF_VALUE: 20
PIF_VALUE: 21
PIF_VALUE: 21
PIF_VALUE: 17
PIF_VALUE: 17
PIF_VALUE: 20
PIF_VALUE: 22
PIF_VALUE: 21
PIF_VALUE: 20
PIF_VALUE: 21
PIF_VALUE: 21
PIF_VALUE: 17
PIF_VALUE: 17
PIF_VALUE: 18
PIF_VALUE: 17
PIF_VALUE: 20
PIF_VALUE: 17
PIF_VALUE: 18
PIF_VALUE: 23

## 2024-11-20 ASSESSMENT — PAIN SCALES - GENERAL
PAINLEVEL_OUTOF10: 6
PAINLEVEL_OUTOF10: 0

## 2024-11-20 NOTE — FLOWSHEET NOTE
Patient continues to reach for lines and tubes despite attempts to deter. Bilateral wrist restraints continued for patient safety.

## 2024-11-20 NOTE — DISCHARGE SUMMARY
Head W/O Contrast    Result Date: 11/15/2024  1. Bilateral acute vs early subacute subdural hematomas.  Measuring up to a maximum thickness 5 mm along the left lateral frontotemporal convexity.  No associated mass effect or intraventricular hemorrhage extension 2. Senescent changes with generalized atrophy and age related volume loss..     CTA PULMONARY W CONTRAST    Result Date: 11/15/2024  1. No acute pulmonary artery embolism. 2. Bilateral lower lobe atelectasis versus airspace disease.     XR CHEST PORTABLE    Result Date: 11/15/2024  1. No acute cardiopulmonary process. 2. Endotracheal tube and gastric tube with good positioning.     XR ABDOMEN (KUB) (SINGLE AP VIEW)    Result Date: 11/15/2024  1. No acute cardiopulmonary process. 2. Endotracheal tube and gastric tube with good positioning.        Disposition:  Hospice House     Discharge Instructions/Follow-up:  Hospice    Code Status:  DNR-CC     Activity: activity as tolerated    Condition at discharge: Unstable, hospice     Diet: Pleasure feeds     Labs: For convenience and continuity at follow-up the following most recent labs are provided:      CBC:    Lab Results   Component Value Date/Time    WBC 6.8 11/20/2024 04:04 AM    HGB 8.1 11/20/2024 04:04 AM    HCT 26.4 11/20/2024 04:04 AM    PLT 81 11/20/2024 04:04 AM       Renal:    Lab Results   Component Value Date/Time     11/20/2024 04:04 AM    K 3.8 11/20/2024 04:04 AM     11/20/2024 04:04 AM    CO2 22 11/20/2024 04:04 AM    BUN 52 11/20/2024 04:04 AM    CREATININE 1.3 11/20/2024 04:04 AM    CALCIUM 8.4 11/20/2024 04:04 AM    PHOS 2.0 11/20/2024 04:04 AM       Discharge Medications:     Current Discharge Medication List             Details   atorvastatin (LIPITOR) 40 MG tablet Take 1 tablet by mouth daily      gabapentin (NEURONTIN) 100 MG capsule Take 1 capsule by mouth daily.      loratadine (CLARITIN) 10 MG tablet Take 1 tablet by mouth daily      losartan (COZAAR) 100 MG tablet Take 1

## 2024-11-20 NOTE — PLAN OF CARE
Problem: Discharge Planning  Goal: Discharge to home or other facility with appropriate resources  Outcome: Not Progressing     Problem: Safety - Medical Restraint  Goal: Remains free of injury from restraints (Restraint for Interference with Medical Device)  Description: INTERVENTIONS:  1. Determine that other, less restrictive measures have been tried or would not be effective before applying the restraint  2. Evaluate the patient's condition at the time of restraint application  3. Inform patient/family regarding the reason for restraint  4. Q2H: Monitor safety, psychosocial status, comfort, nutrition and hydration  Outcome: Not Progressing  Flowsheets (Taken 11/19/2024 2000)  Remains free of injury from restraints (restraint for interference with medical device): Every 2 hours: Monitor safety, psychosocial status, comfort, nutrition and hydration     Problem: Neurosensory - Adult  Goal: Achieves stable or improved neurological status  11/19/2024 2228 by Cody Rodríguez RN  Outcome: Not Progressing  11/19/2024 1225 by Kori Devine RN  Outcome: Progressing  Flowsheets (Taken 11/19/2024 1225)  Achieves stable or improved neurological status:   Assess for and report changes in neurological status   Monitor temperature, glucose, and sodium. Initiate appropriate interventions as ordered  Goal: Remains free of injury related to seizures activity  Outcome: Not Progressing  Goal: Achieves maximal functionality and self care  11/19/2024 1225 by Kori Devine RN  Outcome: Not Progressing     Problem: Cardiovascular - Adult  Goal: Maintains optimal cardiac output and hemodynamic stability  11/19/2024 2228 by Cody Rodríguez RN  Outcome: Not Progressing  11/19/2024 1225 by Kori Devine RN  Outcome: Progressing  Flowsheets (Taken 11/19/2024 1225)  Maintains optimal cardiac output and hemodynamic stability:   Monitor blood pressure and heart rate   Assess for signs of decreased cardiac output   Administer fluid and/or volume 
  Problem: Discharge Planning  Goal: Discharge to home or other facility with appropriate resources  Outcome: Not Progressing     Problem: Skin/Tissue Integrity  Goal: Absence of new skin breakdown  Description: 1.  Monitor for areas of redness and/or skin breakdown  2.  Assess vascular access sites hourly  3.  Every 4-6 hours minimum:  Change oxygen saturation probe site  4.  Every 4-6 hours:  If on nasal continuous positive airway pressure, respiratory therapy assess nares and determine need for appliance change or resting period.  Outcome: Not Progressing     Problem: Safety - Medical Restraint  Goal: Remains free of injury from restraints (Restraint for Interference with Medical Device)  Description: INTERVENTIONS:  1. Determine that other, less restrictive measures have been tried or would not be effective before applying the restraint  2. Evaluate the patient's condition at the time of restraint application  3. Inform patient/family regarding the reason for restraint  4. Q2H: Monitor safety, psychosocial status, comfort, nutrition and hydration  Outcome: Not Progressing  Flowsheets  Taken 11/17/2024 0000 by Garrick Navarro RN  Remains free of injury from restraints (restraint for interference with medical device): Determine that other, less restrictive measures have been tried or would not be effective before applying the restraint  Taken 11/16/2024 2200 by Garrick Navarro RN  Remains free of injury from restraints (restraint for interference with medical device): Determine that other, less restrictive measures have been tried or would not be effective before applying the restraint  Taken 11/16/2024 1800 by Buzz Reynaga RN  Remains free of injury from restraints (restraint for interference with medical device): Determine that other, less restrictive measures have been tried or would not be effective before applying the restraint  Taken 11/16/2024 1730 by Buzz Reynaga RN  Remains free of injury 
  Problem: Neurosensory - Adult  Goal: Absence of seizures  11/19/2024 1225 by Kori Devine RN  Outcome: Progressing  Flowsheets (Taken 11/19/2024 1225)  Absence of seizures:   Monitor for seizure activity.  If seizure occurs, document type and location of movements and any associated apnea   If seizure occurs, turn head to side and suction secretions as needed   Administer anticonvulsants as ordered  11/19/2024 0130 by Nika Thomas RN  Outcome: Progressing  Goal: Achieves stable or improved neurological status  Outcome: Progressing  Flowsheets (Taken 11/19/2024 1225)  Achieves stable or improved neurological status:   Assess for and report changes in neurological status   Monitor temperature, glucose, and sodium. Initiate appropriate interventions as ordered  Goal: Achieves maximal functionality and self care  Outcome: Not Progressing     Problem: Cardiovascular - Adult  Goal: Maintains optimal cardiac output and hemodynamic stability  11/19/2024 1225 by Kori Devine RN  Outcome: Progressing  Flowsheets (Taken 11/19/2024 1225)  Maintains optimal cardiac output and hemodynamic stability:   Monitor blood pressure and heart rate   Assess for signs of decreased cardiac output   Administer fluid and/or volume expanders as ordered  11/19/2024 0130 by Nika Thomas RN  Outcome: Progressing     Problem: Respiratory - Adult  Goal: Achieves optimal ventilation and oxygenation  11/19/2024 1225 by Kori Devine RN  Outcome: Progressing  Flowsheets (Taken 11/19/2024 1225)  Achieves optimal ventilation and oxygenation:   Assess for changes in respiratory status   Assess for changes in mentation and behavior   Position to facilitate oxygenation and minimize respiratory effort  11/19/2024 0817 by Santa Sin RCP  Outcome: Progressing     Problem: Skin/Tissue Integrity - Adult  Goal: Skin integrity remains intact  Outcome: Progressing  Flowsheets (Taken 11/19/2024 1225)  Skin Integrity Remains Intact: Monitor for 
  Problem: Respiratory - Adult  Goal: Achieves optimal ventilation and oxygenation  Outcome: Progressing     
  Problem: Safety - Adult  Goal: Free from fall injury  11/16/2024 0722 by Buzz Reynaga RN  Outcome: Progressing  11/16/2024 0127 by Teresa Hernandez RN  Outcome: Progressing     Problem: Discharge Planning  Goal: Discharge to home or other facility with appropriate resources  Outcome: Progressing     Problem: Skin/Tissue Integrity  Goal: Absence of new skin breakdown  Description: 1.  Monitor for areas of redness and/or skin breakdown  2.  Assess vascular access sites hourly  3.  Every 4-6 hours minimum:  Change oxygen saturation probe site  4.  Every 4-6 hours:  If on nasal continuous positive airway pressure, respiratory therapy assess nares and determine need for appliance change or resting period.  11/16/2024 0722 by Buzz Reynaga RN  Outcome: Progressing  11/16/2024 0127 by Teresa Hernandez RN  Outcome: Progressing     Problem: ABCDS Injury Assessment  Goal: Absence of physical injury  11/16/2024 0722 by Buzz Reynaga RN  Outcome: Progressing  11/16/2024 0127 by Teresa Hernandez RN  Outcome: Progressing     Problem: Pain  Goal: Verbalizes/displays adequate comfort level or baseline comfort level  11/16/2024 0722 by Buzz Reynaga RN  Outcome: Progressing  11/16/2024 0127 by Teresa Hernandez RN  Outcome: Progressing     Problem: Safety - Medical Restraint  Goal: Remains free of injury from restraints (Restraint for Interference with Medical Device)  Description: INTERVENTIONS:  1. Determine that other, less restrictive measures have been tried or would not be effective before applying the restraint  2. Evaluate the patient's condition at the time of restraint application  3. Inform patient/family regarding the reason for restraint  4. Q2H: Monitor safety, psychosocial status, comfort, nutrition and hydration  11/16/2024 0722 by Buzz Reynaga RN  Outcome: Progressing  Flowsheets  Taken 11/16/2024 0600 by Teresa Hernandez RN  Remains free of injury from restraints 
  Problem: Safety - Adult  Goal: Free from fall injury  11/19/2024 0130 by Nika Thomas, RN  Outcome: Progressing  11/18/2024 1817 by Radha Jean-Baptiste RN  Outcome: Progressing     Problem: Discharge Planning  Goal: Discharge to home or other facility with appropriate resources  Outcome: Progressing     Problem: Skin/Tissue Integrity  Goal: Absence of new skin breakdown  Description: 1.  Monitor for areas of redness and/or skin breakdown  2.  Assess vascular access sites hourly  3.  Every 4-6 hours minimum:  Change oxygen saturation probe site  4.  Every 4-6 hours:  If on nasal continuous positive airway pressure, respiratory therapy assess nares and determine need for appliance change or resting period.  Outcome: Progressing     Problem: ABCDS Injury Assessment  Goal: Absence of physical injury  Outcome: Progressing     Problem: Pain  Goal: Verbalizes/displays adequate comfort level or baseline comfort level  Outcome: Progressing     Problem: Safety - Medical Restraint  Goal: Remains free of injury from restraints (Restraint for Interference with Medical Device)  Description: INTERVENTIONS:  1. Determine that other, less restrictive measures have been tried or would not be effective before applying the restraint  2. Evaluate the patient's condition at the time of restraint application  3. Inform patient/family regarding the reason for restraint  4. Q2H: Monitor safety, psychosocial status, comfort, nutrition and hydration  11/19/2024 0130 by Nika Thomas, RN  Outcome: Progressing  Flowsheets (Taken 11/18/2024 2000)  Remains free of injury from restraints (restraint for interference with medical device):   Every 2 hours: Monitor safety, psychosocial status, comfort, nutrition and hydration   Inform patient/family regarding the reason for restraint   Evaluate the patient's condition at the time of restraint application   Determine that other, less restrictive measures have been tried or would not be 
  Problem: Safety - Adult  Goal: Free from fall injury  Outcome: Progressing     Problem: Safety - Medical Restraint  Goal: Remains free of injury from restraints (Restraint for Interference with Medical Device)  Description: INTERVENTIONS:  1. Determine that other, less restrictive measures have been tried or would not be effective before applying the restraint  2. Evaluate the patient's condition at the time of restraint application  3. Inform patient/family regarding the reason for restraint  4. Q2H: Monitor safety, psychosocial status, comfort, nutrition and hydration  Outcome: Progressing  Flowsheets (Taken 11/18/2024 9224)  Remains free of injury from restraints (restraint for interference with medical device): Determine that other, less restrictive measures have been tried or would not be effective before applying the restraint     Problem: Nutrition Deficit:  Goal: Optimize nutritional status  Outcome: Progressing     Problem: Neurosensory - Adult  Goal: Absence of seizures  Outcome: Progressing     Problem: Cardiovascular - Adult  Goal: Maintains optimal cardiac output and hemodynamic stability  Outcome: Progressing     
Chart reviewed.  No incidents overnight or this morning.  Vitals have been stable on ventilator    Neurology is following for AMS in the setting of COVID-19, bilateral subdural hematomas and risk for seizures with abnormal ceribell    EEG on 11/16 consistent with potential for seizures either left-sided or generalized onset and moderate to severe nonspecific encephalopathy    MRI brain is pending    Continue AEDs Keppra 500 mg twice daily and Vimpat 100 mg twice daily    Remains on antibiotics for septic shock, staph bacteremia, COVID    Neurology will follow  
application  Taken 11/15/2024 2000  Remains free of injury from restraints (restraint for interference with medical device):   Determine that other, less restrictive measures have been tried or would not be effective before applying the restraint   Evaluate the patient's condition at the time of restraint application     
restraint  Taken 11/16/2024 1200 by Buzz Reynaga, RN  Remains free of injury from restraints (restraint for interference with medical device): Determine that other, less restrictive measures have been tried or would not be effective before applying the restraint

## 2024-11-21 LAB
MICROORGANISM SPEC CULT: NORMAL
MICROORGANISM SPEC CULT: NORMAL
SERVICE CMNT-IMP: NORMAL
SERVICE CMNT-IMP: NORMAL
SPECIMEN DESCRIPTION: NORMAL
SPECIMEN DESCRIPTION: NORMAL

## 2024-11-21 NOTE — PROGRESS NOTES
[facility]             Pulmonary & Critical Care Medicine                MICU Progress Note                 Written by: Killian Colin MD  Name: Hany Walter : 1945       Age: 79 y.o. MR/Act #    : [id number],  Billing  #    : [id number]   Admit Date: 11/15/2024  5:33 AM LOS: 4,   Hospital Day: 5 Room #      : 4403/4403-A   PCP            : No primary care provider on file.,   Referred by: Teodora Beverly DO ICU Attending: MD Yulisa Marie date: 2024 10:32 AM   ICU Days:       3 Vent Days:    3 LOS: 4,                          Reason for ICU admission           Chief Complaint   Patient presents with    Respiratory Distress     (Arrived on NRB oxygen sats 84%)                          Brief HPI, Presentation & Synopsis                       80 yo M with PMX of Dementia, H/O Fall at [facility] few days ago presented with unresponsiveness & severe hypoxia in ER. He was intubated for acute hypoxic respiratory failure. On arrival to MICU he was found to be hypotensive & was started on levophed drip. CT chest is consistent with bibasilar PNA. CT head consistent with Bilateral subdural hematoma. Admitted in MICU for unresponsiveness & septic shock.     :    Patient sedated on Versed/fentanyl GGT.   Continuing antibiotics  Dexamethasone for COVID  Echo pending  Placing PICC line  Starting tube feeds  Goals of care discussions    :    Patient off sedation  Still not waking up  Spontaneous breathing trials  Discontinue IV fluids  Discussed with wife by bedside and daughter was over FaceTime.  Explained prognosis if he does not wake up.  They do not want trach and PEG.    Active problem list of yesterday:  Active Hospital Problems    Diagnosis Date Noted    Palliative care encounter [Z51.5] 2024    Acute respiratory failure [J96.00] 11/15/2024    Hypernatremia [E87.0] 11/15/2024    Acute metabolic encephalopathy [G93.41] 
       [facility]             Pulmonary & Critical Care Medicine                MICU Progress Note                 Written by: Silver Contreras MD  Name: Hany Walter : 1945       Age: 79 y.o. MR/Act #    : [id number],  Billing  #    : [id number]   Admit Date: 11/15/2024  5:33 AM LOS: 1,   Hospital Day: 2 Room #      : 4403/4403-A   PCP            : No primary care provider on file.,   Referred by: Elieser Edwards MD ICU Attending: MD Yulisa Kwan date: 2024 10:15 AM   ICU Days:       2 Vent Days:    2 LOS: 1,                          Reason for ICU admission           Chief Complaint   Patient presents with    Respiratory Distress     (Arrived on NRB oxygen sats 84%)                          Brief HPI, Presentation & Synopsis                       80 yo M with PMX of Dementia, H/O Fall at [facility] few days ago presented with unresponsiveness & severe hypoxia in ER. He was intubated for acute hypoxic respiratory failure. On arrival to MICU he was found to be hypotensive & was started on levophed drip. CT chest is consistent with bibasilar PNA. CT head consistent with Bilateral subdural hematoma. Admitted in MICU for unresponsiveness & septic shock.     Active problem list of yesterday:  Active Hospital Problems    Diagnosis Date Noted    Acute respiratory failure [J96.00] 11/15/2024    Hypernatremia [E87.0] 11/15/2024    Acute metabolic encephalopathy [G93.41] 11/15/2024    High anion gap metabolic acidosis [E87.29] 11/15/2024    Chronic alcohol use [F10.90] 11/15/2024    Acute kidney injury superimposed on CKD (HCC) [N17.9, N18.9] 11/15/2024    Atrial flutter (HCC) [I48.92] 11/15/2024    Pneumonia due to infectious organism [J18.9] 11/15/2024    Septic shock (HCC) [A41.9, R65.21] 11/15/2024    Subdural hematoma [S06.5XAA] 11/15/2024                           Events of last night                      Continues to be ventilator dependent          
       [facility]             Pulmonary & Critical Care Medicine                MICU Progress Note                 Written by: Killian Colin MD  Name: Hany Walter : 1945       Age: 79 y.o. MR/Act #    : [id number],  Billing  #    : [id number]   Admit Date: 11/15/2024  5:33 AM LOS: 3,   Hospital Day: 4 Room #      : 4403/4403-A   PCP            : No primary care provider on file.,   Referred by: Teodora Beverly DO ICU Attending: MD Yulisa Marie date: 2024 11:48 AM   ICU Days:       3 Vent Days:    3 LOS: 3,                          Reason for ICU admission           Chief Complaint   Patient presents with    Respiratory Distress     (Arrived on NRB oxygen sats 84%)                          Brief HPI, Presentation & Synopsis                       80 yo M with PMX of Dementia, H/O Fall at [facility] few days ago presented with unresponsiveness & severe hypoxia in ER. He was intubated for acute hypoxic respiratory failure. On arrival to MICU he was found to be hypotensive & was started on levophed drip. CT chest is consistent with bibasilar PNA. CT head consistent with Bilateral subdural hematoma. Admitted in MICU for unresponsiveness & septic shock.     :    Patient sedated on Versed/fentanyl GGT.   Continuing antibiotics  Dexamethasone for COVID  Echo pending  Placing PICC line  Starting tube feeds  Goals of care discussions    Active problem list of yesterday:  Active Hospital Problems    Diagnosis Date Noted    Acute respiratory failure [J96.00] 11/15/2024    Hypernatremia [E87.0] 11/15/2024    Acute metabolic encephalopathy [G93.41] 11/15/2024    High anion gap metabolic acidosis [E87.29] 11/15/2024    Chronic alcohol use [F10.90] 11/15/2024    Acute kidney injury superimposed on CKD (HCC) [N17.9, N18.9] 11/15/2024    Atrial flutter (HCC) [I48.92] 11/15/2024    Pneumonia due to infectious organism [J18.9] 11/15/2024    Septic 
    Internal Medicine Progress Note      Synopsis: Patient admitted on 11/15/2024 with history of dementia who was recently at [facility] and suffered a fall while in isolation for COVID. Patient was subsequently intubated on arrival for acute hypoxic respiratory failure and was in septic shock and started on broad spectrum antibiotics and Levophed. Found to have Stap Bacteremia and MRSA PNA. ID consulted.  TTE done with no evidence of endocarditis but was a technically difficult study. CTH performed showing bilateral subdural hematomas. Admitted to the MICU. Neurology and Neurosurgery consulted.Neurosurgery no anti-coagulation until follow up head CT shows complete resolution of hematomas. Otherwise, no acute surgical intervention.  Underwent EEG that showed a potential for seizures and moderate to severe non specific encephalopathy. Keppra reduced to 500 mg BID secondary to renal dosing and Vimpat 100 mg BID started. Patient with significant renal injury on admission. Nephrology following with continued improvement.      Assessment and Plan     Acute Hypoxic Respiratory Failure   COVID-19 PNA  MSSA PNA   Septic Shock  Patient remains on mechanical ventilation   IV Decadron   Vancomycin and Rocephin   Was titrated off pressors yesterday     Staph Bacteremia  Vancomycin and Rocephin   ID consulted. Continue Vancomycin and switch Zoyn to Rocephin  ECHO ordered to rule out vegetation -->limited study but no evidence of endocarditis  Repeat blood cultures negative thus far.     Acute Encephalopathy, infectious/metabolic  Seizure Disorder  Bilateral Subdural Hematomas  Keppra 500 mg BID  Vimpat  Neurology and Neurosurgery following  EEG as above  No full dose anti-coagulation until complete resolution of hematomas     Acute Renal Failure  Improving  Nephrology following     DVT Prophylaxis:   Disposition: Remains inpatient critically ill    Subjective:     Intubated.   Alert, awake. Agitated when 
   11/15/24 0903   Patient Observation   Pulse (!) 124   Respirations 23   SpO2 (!) 83 %   Vent Information   Ventilator Day(s) 1   Ventilator ID 25   Ventilator Safety Check Performed Pre-Use Yes   Vent Mode AC/VC   Ventilator Settings   FiO2  80 %   Vt (Set, mL) 450 mL   Resp Rate (Set) 18 bpm   PEEP/CPAP (cmH2O) 8   Peak Inspiratory Flow (Set) 60 L/sec   Vent Patient Data (Readings)   Vt (Measured) 316 mL   Peak Inspiratory Pressure (cmH2O) 19 cmH2O   Rate Measured 20 br/min   Minute Volume (L/min) 8.43 Liters   Mean Airway Pressure (cmH2O) 11 cmH20   Plateau Pressure (cm H2O) 0 cm H2O   Driving Pressure -8   I:E Ratio 1:3.10   Backup Apnea On   Backup Rate 18 Breaths Per Minute   Backup Vt 450   Vent Alarm Settings   High Pressure (cmH2O) 40 cmH2O   Low Minute Volume (lpm) 4 L/min   High Minute Volume (lpm) 18 L/min   Low Exhaled Vt (ml) 350 mL   High Exhaled Vt (ml) 800 mL   RR Low (bpm) 18   RR High (bpm) 35 br/min   Apnea (secs) 20 secs   Additional Respiratoray Assessments   Humidification Source Heated wire   Humidification Temp 37   Circuit Condensation Drained   Ambu Bag With Mask At Bedside Yes   ETT    Placement Date: 11/15/24   Placed By: In ED  Placement Verified By: Auscultation;Capnometry;Chest X-ray  Mask Ventilation: Ventilated by mask (1)  Airway Type: Cuffed  Airway Tube Size: 8 mm  Location: Oral  Secured At: 25 cm  Measured From: Lips   Secured At 25 cm   Measured From Lips   ETT Placement Center   Secured By Commercial tube prasad       
   11/15/24 0903   Patient Observation   Pulse (!) 124   Respirations 23   SpO2 97 %   Vent Information   Ventilator Day(s) 1   Ventilator ID 25   Ventilator Safety Check Performed Pre-Use Yes   Vent Mode AC/VC   Ventilator Settings   FiO2  80 %   Vt (Set, mL) 450 mL   Resp Rate (Set) 18 bpm   PEEP/CPAP (cmH2O) 8   Peak Inspiratory Flow (Set) 60 L/sec   Vent Patient Data (Readings)   Vt (Measured) 316 mL   Peak Inspiratory Pressure (cmH2O) 19 cmH2O   Rate Measured 20 br/min   Minute Volume (L/min) 8.43 Liters   Mean Airway Pressure (cmH2O) 11 cmH20   Plateau Pressure (cm H2O) 0 cm H2O   Driving Pressure -8   I:E Ratio 1:3.10   Backup Apnea On   Backup Rate 18 Breaths Per Minute   Backup Vt 450   Vent Alarm Settings   High Pressure (cmH2O) 40 cmH2O   Low Minute Volume (lpm) 4 L/min   High Minute Volume (lpm) 18 L/min   Low Exhaled Vt (ml) 350 mL   High Exhaled Vt (ml) 800 mL   RR Low (bpm) 18   RR High (bpm) 35 br/min   Apnea (secs) 20 secs   Additional Respiratoray Assessments   Humidification Source Heated wire   Humidification Temp 37   Circuit Condensation Drained   Ambu Bag With Mask At Bedside Yes   ETT    Placement Date: 11/15/24   Placed By: In ED  Placement Verified By: Auscultation;Capnometry;Chest X-ray  Mask Ventilation: Ventilated by mask (1)  Airway Type: Cuffed  Airway Tube Size: 8 mm  Location: Oral  Secured At: 25 cm  Measured From: Lips   Secured At 25 cm   Measured From Lips   ETT Placement Center   Secured By Commercial tube prasad       
   11/15/24 1211   Patient Observation   Pulse 90   Respirations 25   SpO2 99 %   Ventilator Settings   FiO2  (S)  70 %  (rt titrated)   Vt (Set, mL) 450 mL   Resp Rate (Set) 18 bpm   PEEP/CPAP (cmH2O) 8   Peak Inspiratory Flow (Set) 65 L/sec   Vent Patient Data (Readings)   Vt (Measured) 441 mL   Peak Inspiratory Pressure (cmH2O) 18 cmH2O   Rate Measured 25 br/min   Minute Volume (L/min) 10.9 Liters   Mean Airway Pressure (cmH2O) 12 cmH20   Plateau Pressure (cm H2O) 0 cm H2O   Driving Pressure -8   I:E Ratio 1:2.00   Backup Apnea On   Backup Rate 18 Breaths Per Minute   Backup Vt 450   Vent Alarm Settings   High Pressure (cmH2O) 40 cmH2O   Low Minute Volume (lpm) 4 L/min   High Minute Volume (lpm) 18 L/min   Low Exhaled Vt (ml) 350 mL   High Exhaled Vt (ml) 800 mL   RR Low (bpm) 18   RR High (bpm) 35 br/min   Apnea (secs) 20 secs   Additional Respiratoray Assessments   Humidification Source Heated wire   Humidification Temp 36.9   Circuit Condensation Drained   Airway Clearance   Suction ET Tube   Suction Device Inline suction catheter   Sputum Method Obtained Endotracheal   Sputum Amount Moderate   Sputum Color/Odor Tan;Pink tinged   Sputum Consistency Thin;Thick   ETT    Placement Date: 11/15/24   Placed By: In ED  Placement Verified By: Auscultation;Capnometry;Chest X-ray  Mask Ventilation: Ventilated by mask (1)  Airway Type: Cuffed  Airway Tube Size: 8 mm  Location: Oral  Secured At: 25 cm  Measured From: Lips   Secured At 25 cm   Measured From Lips   ETT Placement Center   Secured By Commercial tube prasad       
   11/15/24 1600   Patient Observation   Pulse 91   Respirations 22   SpO2 96 %   Breath Sounds   Breath Sounds Bilateral Clear   Right Upper Lobe Clear   Right Middle Lobe Clear   Right Lower Lobe Clear   Left Upper Lobe Clear   Left Lower Lobe Clear   Vent Information   Ventilator Day(s) 1   Ventilator ID 25   Vent Mode AC/VC   Ventilator Settings   FiO2  70 %   Vt (Set, mL) 450 mL   Resp Rate (Set) 18 bpm   PEEP/CPAP (cmH2O) 8   Peak Inspiratory Flow (Set) 65 L/sec   Vent Patient Data (Readings)   Vt (Measured) 441 mL   Peak Inspiratory Pressure (cmH2O) 18 cmH2O   Rate Measured 22 br/min   Minute Volume (L/min) 9.23 Liters   Mean Airway Pressure (cmH2O) 12 cmH20   Plateau Pressure (cm H2O) 17 cm H2O   Driving Pressure 9   I:E Ratio 1:2.10   PEEP Intrinsic (cm H2O) 0.3 cm H2O   Static Compliance (L/cm H2O) 45   Backup Apnea On   Backup Rate 18 Breaths Per Minute   Backup Vt 450   Vent Alarm Settings   High Pressure (cmH2O) 40 cmH2O   Low Minute Volume (lpm) 4 L/min   High Minute Volume (lpm) 18 L/min   Low Exhaled Vt (ml) 350 mL   High Exhaled Vt (ml) 800 mL   RR Low (bpm) 18   RR High (bpm) 35 br/min   Apnea (secs) 20 secs   Additional Respiratoray Assessments   Humidification Source Heated wire   Humidification Temp 37   Ambu Bag With Mask At Bedside Yes   ETT    Placement Date: 11/15/24   Placed By: In ED  Placement Verified By: Auscultation;Capnometry;Chest X-ray  Mask Ventilation: Ventilated by mask (1)  Airway Type: Cuffed  Airway Tube Size: 8 mm  Location: Oral  Secured At: 25 cm  Measured From: Lips   Secured At 25 cm   Measured From Lips   Secured By Commercial tube prasad       
   11/15/24 1927   Patient Observation   Pulse 84   Respirations 21   SpO2 94 %   Breath Sounds   Breath Sounds Bilateral Clear   Right Upper Lobe Clear   Right Middle Lobe Clear   Right Lower Lobe Clear   Left Upper Lobe Clear   Left Lower Lobe Clear   Vent Information   Ventilator Day(s) 1   Ventilator ID 25   Vent Mode AC/VC   Ventilator Settings   FiO2  70 %   Vt (Set, mL) 450 mL   Resp Rate (Set) 18 bpm   PEEP/CPAP (cmH2O) 8   Peak Inspiratory Flow (Set) 65 L/sec   Vent Patient Data (Readings)   Vt (Measured) 420 mL   Peak Inspiratory Pressure (cmH2O) 19 cmH2O   Rate Measured 21 br/min   Minute Volume (L/min) 9.34 Liters   Mean Airway Pressure (cmH2O) 12 cmH20   Plateau Pressure (cm H2O) 16 cm H2O   Driving Pressure 8   I:E Ratio 1:2.50   Backup Apnea On   Backup Rate 18 Breaths Per Minute   Backup Vt 450   Vent Alarm Settings   High Pressure (cmH2O) 40 cmH2O   Low Minute Volume (lpm) 4 L/min   High Minute Volume (lpm) 18 L/min   Low Exhaled Vt (ml) 350 mL   High Exhaled Vt (ml) 800 mL   RR Low (bpm) 18   RR High (bpm) 35 br/min   Apnea (secs) 20 secs   Additional Respiratoray Assessments   Humidification Source Heated wire   Humidification Temp 36.4   Circuit Condensation Drained   Ambu Bag With Mask At Bedside Yes   Airway Clearance   Suction ET Tube   Suction Device Inline suction catheter   Sputum Method Obtained Endotracheal   Sputum Amount Small   Sputum Color/Odor Tan   Sputum Consistency Thick   ETT    Placement Date: 11/15/24   Placed By: In ED  Placement Verified By: Auscultation;Capnometry;Chest X-ray  Mask Ventilation: Ventilated by mask (1)  Airway Type: Cuffed  Airway Tube Size: 8 mm  Location: Oral  Secured At: 25 cm  Measured From: Lips   Secured At 25 cm   Measured From Lips   ETT Placement Right   Secured By Commercial tube prasad       
   11/15/24 1955   Patient Observation   Pulse 84   Respirations 21   SpO2 93 %   Ventilator Settings   FiO2  70 %   Vt (Set, mL) 450 mL   Resp Rate (Set) 18 bpm   PEEP/CPAP (cmH2O) (S)  10  (increased peep post abg result)   Vent Patient Data (Readings)   Vt (Measured) 442 mL   Peak Inspiratory Pressure (cmH2O) 21 cmH2O   Rate Measured 20 br/min   Minute Volume (L/min) 9.03 Liters   Mean Airway Pressure (cmH2O) 14 cmH20   Plateau Pressure (cm H2O) 16 cm H2O   Driving Pressure 6   I:E Ratio 1:2.80   Vent Alarm Settings   High Pressure (cmH2O) 40 cmH2O       
   11/19/24 0816   Patient Observation   Pulse 63   Respirations 17   SpO2 98 %   Vent Information   Vent Mode AC/VC   Ventilator Settings   FiO2  40 %   Vt (Set, mL) 450 mL   Resp Rate (Set) 18 bpm   PEEP/CPAP (cmH2O) 8   Peak Inspiratory Flow (Set) 70 L/sec   Vent Patient Data (Readings)   Vt (Measured) 436 mL   Peak Inspiratory Pressure (cmH2O) 20 cmH2O   Rate Measured 18 br/min   Minute Volume (L/min) 7.83 Liters   Peak Inspiratory Flow (lpm) 70 L/sec   Mean Airway Pressure (cmH2O) 11 cmH20   Plateau Pressure (cm H2O) 16 cm H2O   Driving Pressure 8   I:E Ratio 1:3.80   Flow Sensitivity 3 L/min   Static Compliance (L/cm H2O) 51   Backup Apnea On   Backup Rate 18 Breaths Per Minute   Backup Vt 450   Vent Alarm Settings   High Pressure (cmH2O) 40 cmH2O   Low Minute Volume (lpm) 5 L/min   High Minute Volume (lpm) 18 L/min   Low Exhaled Vt (ml) 350 mL   High Exhaled Vt (ml) 800 mL   RR High (bpm) 35 br/min   Apnea (secs) 20 secs   Additional Respiratoray Assessments   Humidification Source Heated wire   Humidification Temp 37   Circuit Condensation Drained   Ambu Bag With Mask At Bedside Yes   Airway Clearance   Suction ET Tube   Suction Device Inline suction catheter   Sputum Method Obtained Endotracheal   Sputum Amount Other (comment)  (none)   ETT    Placement Date: 11/15/24   Placed By: In ED  Placement Verified By: Auscultation;Capnometry;Chest X-ray  Mask Ventilation: Ventilated by mask (1)  Airway Type: Cuffed  Airway Tube Size: 8 mm  Location: Oral  Secured At: 25 cm  Measured From: Lips   Secured At 25 cm   Measured From Lips   Secured By Commercial tube prasad   Site Assessment Dry       
   11/19/24 0900   Patient Observation   Pulse 75   Respirations 17   SpO2 96 %   Vent Information   Vent Mode (S)  CPAP/PS   Ventilator Settings   FiO2  40 %   PEEP/CPAP (cmH2O) 10   Pressure Support (cm H2O) (S)  12 cm H2O   Vent Patient Data (Readings)   Vt (Measured) 562 mL   Peak Inspiratory Pressure (cmH2O) 24 cmH2O   Rate Measured 17 br/min   Minute Volume (L/min) 9.88 Liters   Mean Airway Pressure (cmH2O) 13 cmH20   Plateau Pressure (cm H2O) 16 cm H2O   Driving Pressure 6   I:E Ratio 1:3.90   Flow Sensitivity 3 L/min   Backup Apnea On   Backup Rate 18 Breaths Per Minute   Backup Vt 450   Vent Alarm Settings   High Pressure (cmH2O) 40 cmH2O   Low Minute Volume (lpm) 5 L/min   High Minute Volume (lpm) 18 L/min   Low Exhaled Vt (ml) 350 mL   High Exhaled Vt (ml) 800 mL   RR High (bpm) 35 br/min   Apnea (secs) 20 secs   Additional Respiratoray Assessments   Humidification Source Heated wire   Humidification Temp 37   Ambu Bag With Mask At Bedside Yes   ETT    Placement Date: 11/15/24   Placed By: In ED  Placement Verified By: Auscultation;Capnometry;Chest X-ray  Mask Ventilation: Ventilated by mask (1)  Airway Type: Cuffed  Airway Tube Size: 8 mm  Location: Oral  Secured At: 25 cm  Measured From: Lips   Secured At 25 cm   Measured From Lips   Secured By Commercial tube prasad   Site Assessment Dry       
   11/19/24 1127   Patient Observation   Pulse 69   Respirations 22   SpO2 99 %   Vent Information   Vent Mode CPAP/PS   Ventilator Settings   FiO2  40 %   PEEP/CPAP (cmH2O) 10   Pressure Support (cm H2O) 12 cm H2O   Vent Patient Data (Readings)   Vt (Measured) 1460 mL   Peak Inspiratory Pressure (cmH2O) 23 cmH2O   Rate Measured 15 br/min   Minute Volume (L/min) 10.2 Liters   Mean Airway Pressure (cmH2O) 14 cmH20   Plateau Pressure (cm H2O) 16 cm H2O   Driving Pressure 6   I:E Ratio 1.30:1   Flow Sensitivity 3 L/min   Backup Apnea On   Backup Rate 18 Breaths Per Minute   Backup Vt 450   Vent Alarm Settings   High Pressure (cmH2O) 40 cmH2O   Low Minute Volume (lpm) 5 L/min   High Minute Volume (lpm) 18 L/min   Low Exhaled Vt (ml) 350 mL   High Exhaled Vt (ml) 800 mL   RR High (bpm) 35 br/min   Apnea (secs) 20 secs   Additional Respiratoray Assessments   Humidification Source Heated wire   Humidification Temp 37   Circuit Condensation Drained   Ambu Bag With Mask At Bedside Yes   ETT    Placement Date: 11/15/24   Placed By: In ED  Placement Verified By: Auscultation;Capnometry;Chest X-ray  Mask Ventilation: Ventilated by mask (1)  Airway Type: Cuffed  Airway Tube Size: 8 mm  Location: Oral  Secured At: 25 cm  Measured From: Lips   Secured At 25 cm   Measured From Lips   Secured By Commercial tube prasad   Site Assessment Dry       
  [facility] Quality Flow/Interdisciplinary Rounds Progress Note        Quality Flow Rounds held on November 17, 2024    Disciplines Attending:  Nursing Unit Leadership    Hany Walter was admitted on 11/15/2024  5:33 AM    Anticipated Discharge Date:       Disposition:       Solis Score:  Solis Scale Score: 12    Readmission Risk              Risk of Unplanned Readmission:  14           Discussed patient goal for the day, patient clinical progression, and barriers to discharge.  The following Goal(s) of the Day/Commitment(s) have been identified: wean vasopressor as tolerated / wean ventilator as tolerated     Buzz Reynaga RN  November 17, 2024       
  [facility] Quality Flow/Interdisciplinary Rounds Progress Note        Quality Flow Rounds held on November 16, 2024    Disciplines Attending:  Nursing Unit Leadership    Hany Walter was admitted on 11/15/2024  5:33 AM    Anticipated Discharge Date:       Disposition:       Solis Score:  Solis Scale Score: 11    Readmission Risk              Risk of Unplanned Readmission:  17           Discussed patient goal for the day, patient clinical progression, and barriers to discharge.  The following Goal(s) of the Day/Commitment(s) have been identified:   wean ventilator as tolerated       Buzz Reynaga RN  November 16, 2024       
  Palliative Care Department  [phone]  Palliative Care Progress Note  Provider Aubree Reyes, APRN - CNP     Hany Walter  [id number]  Hospital Day: 5  Date of Initial Consult: 11/17/2024  Referring Provider:  Elieser Edwards MD  Palliative Medicine was consulted for assistance with: goals of care, overwhelming symptoms     HPI:   Hany Walter is a 79 y.o. with a medical history of GERD, pancytopenia, type 2 diabetes mellitus, alcohol use disorder, aphasia, hypertension, frontotemporal dementia who was admitted on 11/15/2024 from [facility] with a CHIEF COMPLAINT of Respiratory Distress, hypoxia, altered mental status.  The patient was intubated due to respiratory distress.  The patient was transferred to ICU for further medical management.  CT chest consistent with bibasilar pneumonia.  CT showed bilateral subdural hematoma.  Neurosurgery, nephrology, neurology consulted.  Palliative medicine consulted for further assistance.    ASSESSMENT/PLAN:     Pertinent Hospital Diagnoses     Acute metabolic encephalopathy  Seizures noted on EEG  Underlying frontotemporal severe dementia  Chronic alcohol use  Bilateral acute VS subacute subdural hematoma  Septic shock, growing MRSA in sputum and blood   Acute hypoxic respiratory failure, aspiration/COVID-19 pneumonia  Hypernatremia  Acute kidney injury on CKD  High anion gap metabolic acidosis  NSTEMI type II?   Multiple ecchymosis from previous fall      Palliative Care Encounter / Counseling Regarding Goals of Care  Please see detailed goals of care discussion as below  At this time, Hany Walter, Does Not have capacity for medical decision-making.  Capacity is time limited and situation/question specific  During encounter Isabella was surrogate medical decision-maker  Outcome of goals of care meeting:   Continue current medical management  Discussed CODE STATUS options  Hospice consult for information  Code status [id number]  Advanced Directives: no POA or living will in 
.                                                                                                                                HOSPITALIST PROGRESS NOTES     ADMITTING DATE AND TIME: 11/15/2024  5:33 AM  had concerns including Respiratory Distress ((Arrived on [facility] oxygen sats 84%)).    ADMIT DX: Acute respiratory failure [J96.00]  Subdural hematoma [S06.5XAA]  JACOB (acute kidney injury) (HCC) [N17.9]  Acute respiratory failure with hypoxia [J96.01]  Septic shock (HCC) [A41.9, R65.21]  Atrial flutter, unspecified type (HCC) [I48.92]  Pneumonia due to infectious organism, unspecified laterality, unspecified part of lung [J18.9]      SUBJECTIVE:  Patient is being followed for Acute respiratory failure [J96.00]  Subdural hematoma [S06.5XAA]  JACOB (acute kidney injury) (HCC) [N17.9]  Acute respiratory failure with hypoxia [J96.01]  Septic shock (HCC) [A41.9, R65.21]  Atrial flutter, unspecified type (HCC) [I48.92]  Pneumonia due to infectious organism, unspecified laterality, unspecified part of lung [J18.9]     Patient was seen examined and evaluated  Recent lab results, charts and pertinent diagnostic imaging reviewed   Ancillary service notes reviewed   Sedated   Discussed with MICU team and family     Care reviewed with nursing staff, medical and surgical specialty care, primary care and the patient's family as available.   ROS: denies fever, chills, cp, sob, n/v, HA unless stated above.      albumin human 25%  25 g IntraVENous Q6H    vancomycin  1,250 mg IntraVENous Once    chlorhexidine  15 mL Mouth/Throat BID    pantoprazole (PROTONIX) 40 mg in sodium chloride (PF) 0.9 % 10 mL injection  40 mg IntraVENous Daily    heparin (porcine)  5,000 Units SubCUTAneous 3 times per day    hydrocortisone sodium succinate PF (SOLU-CORTEF) 100 mg in sterile water 2 mL injection  100 mg IntraVENous Q8H    piperacillin-tazobactam  4,500 mg IntraVENous Q12H    Polyvinyl Alcohol-Povidone PF  1 drop Both Eyes Q4H    And    artificial 
.                                                                                                                                HOSPITALIST PROGRESS NOTES     ADMITTING DATE AND TIME: 11/15/2024  5:33 AM  had concerns including Respiratory Distress ((Arrived on [facility] oxygen sats 84%)).    ADMIT DX: Acute respiratory failure [J96.00]  Subdural hematoma [S06.5XAA]  JACOB (acute kidney injury) (HCC) [N17.9]  Acute respiratory failure with hypoxia [J96.01]  Septic shock (HCC) [A41.9, R65.21]  Atrial flutter, unspecified type (HCC) [I48.92]  Pneumonia due to infectious organism, unspecified laterality, unspecified part of lung [J18.9]      SUBJECTIVE:  Patient is being followed for Acute respiratory failure [J96.00]  Subdural hematoma [S06.5XAA]  JACOB (acute kidney injury) (HCC) [N17.9]  Acute respiratory failure with hypoxia [J96.01]  Septic shock (HCC) [A41.9, R65.21]  Atrial flutter, unspecified type (HCC) [I48.92]  Pneumonia due to infectious organism, unspecified laterality, unspecified part of lung [J18.9]     Patient was seen examined and evaluated  Recent lab results, charts and pertinent diagnostic imaging reviewed   Ancillary service notes reviewed   Sedated   Discussed with MICU team and family     Care reviewed with nursing staff, medical and surgical specialty care, primary care and the patient's family as available.   ROS: denies fever, chills, cp, sob, n/v, HA unless stated above.      potassium phosphate IVPB (CENTRAL LINE)  20 mmol IntraVENous Once    insulin lispro  0-8 Units SubCUTAneous 4x Daily AC & HS    levETIRAcetam  500 mg IntraVENous Q12H    lacosamide (VIMPAT) 100 mg in sodium chloride 0.9 % 60 mL IVPB  100 mg IntraVENous BID    chlorhexidine  15 mL Mouth/Throat BID    pantoprazole (PROTONIX) 40 mg in sodium chloride (PF) 0.9 % 10 mL injection  40 mg IntraVENous Daily    heparin (porcine)  5,000 Units SubCUTAneous 3 times per day    hydrocortisone sodium succinate PF (SOLU-CORTEF) 100 mg in sterile 
12 fr. Small bowel feeding tube placed right nares using [employer]vue tracking device. Tube advanced 95 cm. Bridle place. Pt. Tolerated well. Xray ordered.   
4 Eyes Skin Assessment     NAME:  Hany Walter  YOB: 1945  MEDICAL RECORD NUMBER:  [id number]    The patient is being assessed for  Admission    I agree that at least one RN has performed a thorough Head to Toe Skin Assessment on the patient. ALL assessment sites listed below have been assessed.      Areas assessed by both nurses:    Head, Face, Ears, Shoulders, Back, Chest, Arms, Elbows, Hands, Sacrum. Buttock, Coccyx, Ischium, Legs. Feet and Heels, and Under Medical Devices         Does the Patient have a Wound? Yes wound(s) were present on assessment. LDA wound assessment was Initiated and completed by RN       Bilateral knee ecchymosis and abrasions from falls  R orbital ecchymosis  B/L elbows/ B/L heels/ and coccyx - blanchable redness  Nonblanchable wound on L lower buttocks 2.5 X 2    Solis Prevention initiated by RN: Yes  Wound Care Orders initiated by RN: No    Pressure Injury (Stage 3,4, Unstageable, DTI, NWPT, and Complex wounds) if present, place Wound referral order by RN under : No    New Ostomies, if present place, Ostomy referral order under : No     Nurse 1 eSignature: Electronically signed by Soco Boyd RN on 11/15/24 at 10:38 AM EST    **SHARE this note so that the co-signing nurse can place an eSignature**    Nurse 2 eSignature: Electronically signed by Aubree Pruitt RN on 11/15/24 at 9:10 AM EST  
Addendum 11/16 @ 1410: Giving additional 500 mg IV x 1 dose to reach a total loading dose of 1750 mg     Yumiko Martínez PharmD  PGY1 Pharmacy Practice Resident [phone]  11/16/2024 2:11 PM      Pharmacy Consultation Note  (Antibiotic Dosing and Monitoring)    Initial consult date: 11/16/2024  Consulting physician/provider: Diane  Drug: Vancomycin  Indication: Bloodstream Infection    Age/  Gender Height Weight IBW  Allergy Information   79 y.o./male 172.7 cm (5' 8\") 80.7 kg (178 lb)     Ideal body weight: 68.4 kg (150 lb 12.7 oz)  Adjusted ideal body weight: 73.3 kg (161 lb 10.8 oz)   Patient has no allergy information on record.      Renal Function:  Recent Labs     11/15/24  1825 11/16/24  0052 11/16/24  0443   BUN 73* 74* 73*   CREATININE 3.4* 3.1* 3.0*       Intake/Output Summary (Last 24 hours) at 11/16/2024 0915  Last data filed at 11/16/2024 0719  Gross per 24 hour   Intake 4579.06 ml   Output 1020 ml   Net 3559.06 ml       Vancomycin Monitoring:  Trough:  No results for input(s): \"VANCOTROUGH\" in the last 72 hours.  Random:  No results for input(s): \"VANCORANDOM\" in the last 72 hours.    Vancomycin Administration Times:  Recent vancomycin administrations        No vancomycin IV orders with administrations found.                    Assessment:  Patient is a 79 y.o. male who has been initiated on vancomycin  Estimated Creatinine Clearance: 20 mL/min (A) (based on SCr of 3 mg/dL (H)).  To dose vancomycin, pharmacy will be utilizing dosing based off of levels because of patient's renal impairment/insufficiency    Plan:  Vancomycin 1250 mg IV x 1 dose  Random level 11/17 AM  Will check vancomycin levels when appropriate  Will continue to monitor renal function   Pharmacy to follow      Yumiko Martínez PharmD  PGY1 Pharmacy Practice Resident [phone]  11/16/2024 9:20 AM    DAYA: [phone]  SEY: [phone]  SJW: [phone]    
[facility]  MD Kristofer Yeh MD Ali Hassan, MD Lisa Kniska, CNP   Fely Obrien, DIEGO Rich, MELQUIADES  Progress Note    11/19/2024    SUBJECTIVE:   11/16: Remains critically ill.  In ICU.  Intubated, on ventilator.  BP stable.  Hypernatremic/dehydrated, hypotonic IV fluid initiated yesterday with improvement.  Given SPA/Lasix earlier today.     11/17: Remains critically ill.  In ICU on ventilator via ETT.  Vent setting stable.  BP stable.    11/18: Critically ill in the ICU. Mechanically ventilated and sedated. FiO2 is 40 %. Not on pressors. Has mild JVD, otherwise no signs of fluid overload. Urine output is stable. Tube feedings being started today.     11/19: Seen in the ICU, critically ill. Mechanically ventilated, FiO2 40 %. Sedation is off. He is opening his eyes today. Wife is present at the bedside. Blood pressure is stable. Nonoliguric.     PROBLEM LIST:    Principal Problem:    Acute respiratory failure  Active Problems:    Hypernatremia    Acute metabolic encephalopathy    High anion gap metabolic acidosis    Chronic alcohol use    Acute kidney injury superimposed on CKD (HCC)    Atrial flutter (HCC)    Pneumonia due to infectious organism    Septic shock (HCC)    Subdural hematoma    Palliative care encounter  Resolved Problems:    * No resolved hospital problems. *         DIET:    ADULT TUBE FEEDING; Orogastric; Diabetic; Continuous; 10; Yes; 10; Q 12 hours; 50; 200; Q 6 hours     MEDS (scheduled):    [START ON 11/20/2024] insulin glargine  12 Units SubCUTAneous Daily    sodium chloride flush  5-40 mL IntraVENous 2 times per day    lidocaine 1 % injection  50 mg IntraDERmal Once    dexAMETHasone  6 mg IntraVENous Daily    ipratropium 0.5 mg-albuterol 2.5 mg  1 Dose Inhalation 4x Daily RT    cefTRIAXone (ROCEPHIN) IV  2,000 mg IntraVENous Q24H    modafinil  200 mg Oral Daily    vancomycin (VANCOCIN) intermittent dosing (placeholder)   Other RX 
[facility]  MD Kristofer Yeh MD Ali Hassan, MD Lisa Kniska, CNP   Fely Obrien, DIEGO Rich, MELQUIADES  Progress Note    11/20/2024    SUBJECTIVE:   11/16: Remains critically ill.  In ICU.  Intubated, on ventilator.  BP stable.  Hypernatremic/dehydrated, hypotonic IV fluid initiated yesterday with improvement.  Given SPA/Lasix earlier today.     11/17: Remains critically ill.  In ICU on ventilator via ETT.  Vent setting stable.  BP stable.    11/18: Critically ill in the ICU. Mechanically ventilated and sedated. FiO2 is 40 %. Not on pressors. Has mild JVD, otherwise no signs of fluid overload. Urine output is stable. Tube feedings being started today.     11/19: Seen in the ICU, critically ill. Mechanically ventilated, FiO2 40 %. Sedation is off. He is opening his eyes today. Wife is present at the bedside. Blood pressure is stable. Nonoliguric.     11/20:  Remains critically ill.  Mechanically ventilated FiO2- 40% peep-8.  D5W infusion.  Sedation is off.  Opens eyes.  Intermittently following simple commands.  BP stable.  UO stable.  Family at bedside.    PROBLEM LIST:    Principal Problem:    Acute respiratory failure  Active Problems:    Hypernatremia    Acute metabolic encephalopathy    High anion gap metabolic acidosis    Chronic alcohol use    Acute kidney injury superimposed on CKD (HCC)    Atrial flutter (HCC)    Pneumonia due to infectious organism    Septic shock (HCC)    Subdural hematoma    Palliative care encounter  Resolved Problems:    * No resolved hospital problems. *         DIET:    ADULT TUBE FEEDING; Orogastric; Diabetic; Continuous; 10; Yes; 10; Q 12 hours; 50; 400; Q 4 hours     MEDS (scheduled):    insulin glargine  12 Units SubCUTAneous Daily    vancomycin  1,500 mg IntraVENous Q24H    sodium chloride flush  5-40 mL IntraVENous 2 times per day    lidocaine 1 % injection  50 mg IntraDERmal Once    dexAMETHasone  6 mg IntraVENous Daily    
[facility]  MD Kristofer Yeh, MD Kimberley Brown, CNP   Fely Obrien, DIEGO Rich, MELQUIADES  Progress Note    11/17/2024    SUBJECTIVE:   11/16: Remains critically ill.  In ICU.  Intubated, on ventilator.  BP stable.  Hypernatremic/dehydrated, hypotonic IV fluid initiated yesterday with improvement.  Given SPA/Lasix earlier today.     11/17: Remains critically ill.  In ICU on ventilator via ETT.  Vent setting stable.  BP stable.    PROBLEM LIST:    Principal Problem:    Acute respiratory failure  Active Problems:    Hypernatremia    Acute metabolic encephalopathy    High anion gap metabolic acidosis    Chronic alcohol use    Acute kidney injury superimposed on CKD (HCC)    Atrial flutter (HCC)    Pneumonia due to infectious organism    Septic shock (HCC)    Subdural hematoma  Resolved Problems:    * No resolved hospital problems. *         DIET:    No diet orders on file     MEDS (scheduled):    potassium phosphate IVPB (CENTRAL LINE)  20 mmol IntraVENous Once    insulin lispro  0-8 Units SubCUTAneous 4x Daily AC & HS    levETIRAcetam  500 mg IntraVENous Q12H    lacosamide (VIMPAT) 100 mg in sodium chloride 0.9 % 60 mL IVPB  100 mg IntraVENous BID    chlorhexidine  15 mL Mouth/Throat BID    pantoprazole (PROTONIX) 40 mg in sodium chloride (PF) 0.9 % 10 mL injection  40 mg IntraVENous Daily    heparin (porcine)  5,000 Units SubCUTAneous 3 times per day    hydrocortisone sodium succinate PF (SOLU-CORTEF) 100 mg in sterile water 2 mL injection  100 mg IntraVENous Q8H    piperacillin-tazobactam  4,500 mg IntraVENous Q12H    Polyvinyl Alcohol-Povidone PF  1 drop Both Eyes Q4H    And    artificial tears   Both Eyes Q4H    ipratropium 0.5 mg-albuterol 2.5 mg  1 Dose Inhalation Q4H RT    budesonide  0.5 mg Nebulization BID RT       MEDS (infusions):   sodium chloride 150 mL/hr at 11/17/24 0707    fentaNYL Stopped (11/17/24 0957)    norepinephrine 2 mcg/min 
Called EEG department for Dr. Contreras. EEG department not answering - voicemail left for STAT EEG that physician wants today.   
Comprehensive Nutrition Assessment    Type and Reason for Visit:  Initial, Nutrition support    Nutrition Recommendations/Plan:     Continue NPO, Start Tube Feeding (Current order meets 100% est needs).     Pt likely at risk for re-feeding syndrome. Monitor/ replace electrolytes prn       Malnutrition Assessment:  Malnutrition Status:  Insufficient data (11/18/24 7275)    Context:  Social/Environmental Circumstances     Findings of the 6 clinical characteristics of malnutrition:  Energy Intake:  Mild decrease in energy intake (unable to quantify amount/ time frame. limited data)  Weight Loss:  Unable to assess (no wt hx on file)     Body Fat Loss:  Unable to assess (covid iso)   Muscle Mass Loss:  Unable to assess (covid iso)   Fluid Accumulation:  No fluid accumulation    Strength:  Not Performed    Nutrition Assessment:    [facility] admit [facility] w/ Acute Metabolic Encephalopathy/ Respiratory Failure found to have B/L SDH +Seizure. Noted Respiratory failure 2/2 Aspiration PNA +COVID19. Noted JACOB. PMHx DM, Dementia, frequent falls, ETOH abuse, & CKD. Noted poor appetite PTA in setting of agitation/AMS per H&P. Plans to start EN support. Current TF order is appropriate. Will follow.    Nutrition Related Findings:    Pt intubated/ sedated, intermittent hypotension (not on pressor), +I/O's 10L, nonpitting edema, active BS, OGT clamped, hyperglycemia, hypernatremia     Wound Type: Open Wounds (buttocks)       Current Nutrition Intake & Therapies:    Average Meal Intake: NPO    Current Tube Feeding (TF) Orders:  Feeding Route: Orogastric  Formula: Diabetic  Schedule: Continuous  Feeding Regimen: 50 ml/hr, not started yet  Water Flushes: 200 ml q 6 hr = 800 ml water  Current TF Provides: 1200 ml tv, 1800 kcals, 99 gm pro, 910 ml free water, 1710 ml total water w/ flushes    Anthropometric Measures:  Height: 172.7 cm (5' 8\")  Ideal Body Weight (IBW): 154 lbs (70 kg)    Current Body Weight: 80.7 kg 
Headband: applied  Date/Time: 1115  Recorder: recording started  Skin: intact  Highest Seizure [place] Percentage past hour:       General info regarding Seizure [place] %:  Minimum duration of study is 2 hours. If Seizure [place] has remained 0% throughout the entire 2-hour duration, communicate with provider to stop the recording.     Seizure [place] 0-10% - Continue to monitor and complete 2-hour study.  Seizure [place] 11-89% - Epileptiform activity present. Notify provider for next steps.  Seizure [place] >/= 90% - Epileptiform activity consistent with Status Epilepticus. Immediately notify provider.     *Patients with Seizure [place] above 10% that persists may require a study longer than 2 hours. Maximum recording duration is 24 hours. Please update provider with a persistent increase in Seizure [place] above 10%.    
Headband: remains in place  Date/Time: 11/15/24 2158  Recorder: recording started  Skin: intact  Highest Seizure [place] Percentage past hour: 90  Notified provider, provider at bedside. Versed Drip started.  [place] number trending down number at 23 at 2345.    General info regarding Seizure [place] %:  Minimum duration of study is 2 hours. If Seizure [place] has remained 0% throughout the entire 2-hour duration, communicate with provider to stop the recording.     Seizure [place] 0-10% - Continue to monitor and complete 2-hour study.  Seizure [place] 11-89% - Epileptiform activity present. Notify provider for next steps.  Seizure [place] >/= 90% - Epileptiform activity consistent with Status Epilepticus. Immediately notify provider.     *Patients with Seizure [place] above 10% that persists may require a study longer than 2 hours. Maximum recording duration is 24 hours. Please update provider with a persistent increase in Seizure [place] above 10%.   
Headband: remains in place  Date/Time: 1300  Recorder: continuous   Skin: intact  Highest Seizure [place] Percentage past hour: 81%    Dr. Contreras notified    General info regarding Seizure [place] %:  Minimum duration of study is 2 hours. If Seizure [place] has remained 0% throughout the entire 2-hour duration, communicate with provider to stop the recording.     Seizure [place] 0-10% - Continue to monitor and complete 2-hour study.  Seizure [place] 11-89% - Epileptiform activity present. Notify provider for next steps.  Seizure [place] >/= 90% - Epileptiform activity consistent with Status Epilepticus. Immediately notify provider.     *Patients with Seizure [place] above 10% that persists may require a study longer than 2 hours. Maximum recording duration is 24 hours. Please update provider with a persistent increase in Seizure [place] above 10%.      
[facility]  [facility]  Progress Note      Chief Complaint   Patient presents with    Respiratory Distress     (Arrived on [facility]B oxygen sats 84%)       SUBJECTIVE:    Patient is tolerating medications. No reported adverse drug reactions.  No nausea, vomiting, diarrhea.    Review of systems:  As stated above in the chief complaint, otherwise negative.    Medications:  Scheduled Meds:   [START ON 2024] insulin glargine  12 Units SubCUTAneous Daily    vancomycin  1,500 mg IntraVENous Q24H    sodium chloride flush  5-40 mL IntraVENous 2 times per day    lidocaine 1 % injection  50 mg IntraDERmal Once    dexAMETHasone  6 mg IntraVENous Daily    ipratropium 0.5 mg-albuterol 2.5 mg  1 Dose Inhalation 4x Daily RT    cefTRIAXone (ROCEPHIN) IV  2,000 mg IntraVENous Q24H    modafinil  200 mg Oral Daily    insulin lispro  0-8 Units SubCUTAneous 4x Daily AC & HS    levETIRAcetam  500 mg IntraVENous Q12H    lacosamide (VIMPAT) 100 mg in sodium chloride 0.9 % 60 mL IVPB  100 mg IntraVENous BID    chlorhexidine  15 mL Mouth/Throat BID    pantoprazole (PROTONIX) 40 mg in sodium chloride (PF) 0.9 % 10 mL injection  40 mg IntraVENous Daily    heparin (porcine)  5,000 Units SubCUTAneous 3 times per day    Polyvinyl Alcohol-Povidone PF  1 drop Both Eyes Q4H    And    artificial tears   Both Eyes Q4H    budesonide  0.5 mg Nebulization BID RT     Continuous Infusions:   sodium chloride      dextrose       PRN Meds:white petrolatum, sodium chloride flush, sodium chloride, ALTEplase, glucose, dextrose bolus **OR** dextrose bolus, glucagon (rDNA), dextrose, [DISCONTINUED] fentaNYL **AND** fentaNYL, acetaminophen    OBJECTIVE:  /72   Pulse 74   Temp 98.4 °F (36.9 °C) (Esophageal)   Resp 15   Ht 1.727 m (5' 8\")   Wt 91.7 kg (202 lb 2.6 oz)   SpO2 98%   BMI 30.74 kg/m²   Temp  Av.1 °F (36.7 °C)  Min: 96.3 °F (35.7 °C)  Max: 99.7 °F (37.6 °C)  Constitutional: The patient is awake, alert, and 
Neurosurg progress note  VITALS:  /63   Pulse 77   Temp 98.2 °F (36.8 °C) (Bladder)   Resp 18   Ht 1.727 m (5' 8\")   Wt 80.7 kg (178 lb)   SpO2 97%   BMI 27.06 kg/m²   24HR INTAKE/OUTPUT:    Intake/Output Summary (Last 24 hours) at 11/16/2024 1112  Last data filed at 11/16/2024 0900  Gross per 24 hour   Intake 4579.06 ml   Output 900 ml   Net 3679.06 ml     XR CHEST PORTABLE    Result Date: 11/16/2024  EXAMINATION: ONE XRAY VIEW OF THE CHEST 11/16/2024 8:03 am COMPARISON: 11/15/2024 HISTORY: ORDERING SYSTEM PROVIDED HISTORY: PNA TECHNOLOGIST PROVIDED HISTORY: Reason for exam:->PNA FINDINGS: Heart size is normal.  There are no infiltrates or effusions.  Support tubes are appropriate.     No acute cardiopulmonary process.     CT Head W/O Contrast    Result Date: 11/15/2024  EXAMINATION: CT OF THE HEAD WITHOUT CONTRAST  11/15/2024 6:47 am TECHNIQUE: CT of the head was performed without the administration of intravenous contrast. Automated exposure control, iterative reconstruction, and/or weight based adjustment of the mA/kV was utilized to reduce the radiation dose to as low as reasonably achievable. COMPARISON: None. HISTORY: ORDERING SYSTEM PROVIDED HISTORY: ams TECHNOLOGIST PROVIDED HISTORY: Has a \"code stroke\" or \"stroke alert\" been called?->No Reason for exam:->ams Decision Support Exception - unselect if not a suspected or confirmed emergency medical condition->Emergency Medical Condition (MA) What reading provider will be dictating this exam?->CRC FINDINGS: BRAIN/VENTRICLES: There is no acute intracranial hemorrhage, mass effect or midline shift.  Extra-axial fluid collections seen bilateral with mixed hyperdense signal favoring acute or subacute subdural hematomas seen along the bilateral cerebral convexities up to a 5 mm thickness along the left lateral margin and 3 mm along the posterior margin.  No intraventricular hemorrhage extension.  The gray-white differentiation is maintained without 
Patient admitted to MICU with NO belongings.   
Patient picked up and taken to hospice house.   
Patient reaches for ET tube and IV lines when not restrained. Patient in bilateral soft wrist restraints for safety.   
Pharmacy Consultation Note  (Antibiotic Dosing and Monitoring)    Initial consult date: 11/16/2024  Consulting physician/provider: Diane  Drug: Vancomycin  Indication: Bloodstream Infection    Age/  Gender Height Weight IBW  Allergy Information   79 y.o./male 172.7 cm (5' 8\") 80.7 kg (178 lb)     Ideal body weight: 68.4 kg (150 lb 12.7 oz)  Adjusted ideal body weight: 73.3 kg (161 lb 10.8 oz)   Patient has no allergy information on record.      Renal Function:  Recent Labs     11/16/24  0945 11/16/24  1217 11/17/24  0416   BUN 71* 68* 61*   CREATININE 2.9* 2.7* 2.2*       Intake/Output Summary (Last 24 hours) at 11/17/2024 0758  Last data filed at 11/17/2024 0700  Gross per 24 hour   Intake 4126.24 ml   Output 1210 ml   Net 2916.24 ml       Vancomycin Monitoring:  Trough:  No results for input(s): \"VANCOTROUGH\" in the last 72 hours.  Random:    Recent Labs     11/17/24  0416   VANCORANDOM 6.1       Vancomycin Administration Times:  Recent vancomycin administrations                     vancomycin (VANCOCIN) 500 mg in sodium chloride 0.9 % 100 mL IVPB (mg) 500 mg New Bag 11/16/24 1534    vancomycin (VANCOCIN) 1,250 mg in sodium chloride 0.9 % 250 mL IVPB ([id number]) (mg) 1,250 mg New Bag 11/16/24 1104                      Assessment:  Patient is a 79 y.o. male who has been initiated on vancomycin  Estimated Creatinine Clearance: 27 mL/min (A) (based on SCr of 2.2 mg/dL (H)).  To dose vancomycin, pharmacy will be utilizing dosing based off of levels because of patient's renal impairment/insufficiency  11/17: scr 2.2, trending towards reported baseline of 1.3, wbc 30, vancomycin 1750 mg given 11/16, random level 6.1 ~ 13 hours after last dose, dosing based on levels due to JACOB    Plan:  Vancomycin 1500 mg IV x 1  Random level 11/18 AM  Will check vancomycin levels when appropriate  Will continue to monitor renal function   Pharmacy to follow      Yumiko Martínez, PharmD  PGY1 Pharmacy Practice Resident [phone]  11/17/2024 
Pharmacy Consultation Note  (Antibiotic Dosing and Monitoring)    Initial consult date: 11/16/2024  Consulting physician/provider: Diane  Drug: Vancomycin  Indication: Bloodstream Infection    Age/  Gender Height Weight IBW  Allergy Information   79 y.o./male 172.7 cm (5' 8\") 80.7 kg (178 lb)     Ideal body weight: 68.4 kg (150 lb 12.7 oz)  Adjusted ideal body weight: 73.3 kg (161 lb 10.8 oz)   Patient has no allergy information on record.      Renal Function:  Recent Labs     11/17/24  0416 11/17/24  1500 11/18/24  0404   BUN 61* 61* 60*   CREATININE 2.2* 2.1* 1.8*       Intake/Output Summary (Last 24 hours) at 11/18/2024 1609  Last data filed at 11/18/2024 0940  Gross per 24 hour   Intake 3268.25 ml   Output 635 ml   Net 2633.25 ml       Vancomycin Monitoring:  Trough:  No results for input(s): \"VANCOTROUGH\" in the last 72 hours.  Random:    Recent Labs     11/17/24  0416 11/18/24  0404   VANCORANDOM 6.1 14.2       Vancomycin Administration Times:    Recent vancomycin administrations                     vancomycin (VANCOCIN) 1,500 mg in sodium chloride 0.9 % 250 mL IVPB ([id number]) (mg) 1,500 mg New Bag 11/18/24 1143    vancomycin (VANCOCIN) 1,500 mg in sodium chloride 0.9 % 250 mL IVPB ([id number]) (mg) 1,500 mg New Bag 11/17/24 0923    vancomycin (VANCOCIN) 500 mg in sodium chloride 0.9 % 100 mL IVPB (mg) 500 mg New Bag 11/16/24 1534    vancomycin (VANCOCIN) 1,250 mg in sodium chloride 0.9 % 250 mL IVPB ([id number]) (mg) 1,250 mg New Bag 11/16/24 1104                  Assessment:  Patient is a 79 y.o. male who has been initiated on vancomycin  Estimated Creatinine Clearance: 33 mL/min (A) (based on SCr of 1.8 mg/dL (H)).  To dose vancomycin, pharmacy will be utilizing dosing based off of levels because of patient's renal impairment/insufficiency  11/17: scr 2.2, trending towards reported baseline of 1.3, wbc 30, vancomycin 1750 mg given 11/16, random level 6.1 ~ 13 hours after last dose, dosing based on levels due 
Pharmacy Consultation Note  (Antibiotic Dosing and Monitoring)    Initial consult date: 11/16/2024  Consulting physician/provider: Diane  Drug: Vancomycin  Indication: Bloodstream Infection    Age/  Gender Height Weight IBW  Allergy Information   79 y.o./male 172.7 cm (5' 8\") 80.7 kg (178 lb)     Ideal body weight: 68.4 kg (150 lb 12.7 oz)  Adjusted ideal body weight: 77.7 kg (171 lb 5.5 oz)   Patient has no allergy information on record.      Renal Function:  Recent Labs     11/19/24  0403 11/19/24  1653 11/20/24  0404   BUN 59* 57* 52*   CREATININE 1.5* 1.3* 1.3*       Intake/Output Summary (Last 24 hours) at 11/20/2024 1328  Last data filed at 11/20/2024 0300  Gross per 24 hour   Intake 807.57 ml   Output 900 ml   Net -92.43 ml       Vancomycin Monitoring:  Trough:  No results for input(s): \"VANCOTROUGH\" in the last 72 hours.  Random:    Recent Labs     11/18/24 0404 11/19/24 0403   VANCORANDOM 14.2 17.3       Vancomycin Administration Times:    Recent vancomycin administrations                     vancomycin (VANCOCIN) 1,500 mg in sodium chloride 0.9 % 250 mL IVPB ([id number]) (mg) 1,500 mg New Bag 11/19/24 1437    vancomycin (VANCOCIN) 1,500 mg in sodium chloride 0.9 % 250 mL IVPB ([id number]) (mg) 1,500 mg New Bag 11/18/24 1143                  Assessment:  Patient is a 79 y.o. male who has been initiated on vancomycin  Estimated Creatinine Clearance: 52 mL/min (A) (based on SCr of 1.3 mg/dL (H)).  To dose vancomycin, pharmacy will be utilizing dosing based off of levels because of patient's renal impairment/insufficiency  11/17: scr 2.2, trending towards reported baseline of 1.3, wbc 30, vancomycin 1750 mg given 11/16, random level 6.1 ~ 13 hours after last dose, dosing based on levels due to JACOB  11/18: Scr 1.8, random level is 14.2 mcg/mL  11/19: Scr continues to improve to 1.5 (baseline~1.3), random level is 17.3 mcg/mL  11/20: Scr 1.3, UOP 0.7 mL/kg/hr     Plan:  Continue vancomycin 1500 mg IV q 24 hr 
Pharmacy Consultation Note  (Antibiotic Dosing and Monitoring)    Initial consult date: 11/16/2024  Consulting physician/provider: iDane  Drug: Vancomycin  Indication: Bloodstream Infection    Age/  Gender Height Weight IBW  Allergy Information   79 y.o./male 172.7 cm (5' 8\") 80.7 kg (178 lb)     Ideal body weight: 68.4 kg (150 lb 12.7 oz)  Adjusted ideal body weight: 77.7 kg (171 lb 5.5 oz)   Patient has no allergy information on record.      Renal Function:  Recent Labs     11/18/24  0404 11/18/24  1330 11/19/24  0403   BUN 60* 59* 59*   CREATININE 1.8* 1.8* 1.5*       Intake/Output Summary (Last 24 hours) at 11/19/2024 1241  Last data filed at 11/19/2024 1100  Gross per 24 hour   Intake 2986.02 ml   Output 1350 ml   Net 1636.02 ml       Vancomycin Monitoring:  Trough:  No results for input(s): \"VANCOTROUGH\" in the last 72 hours.  Random:    Recent Labs     11/17/24  0416 11/18/24  0404 11/19/24  0403   VANCORANDOM 6.1 14.2 17.3       Vancomycin Administration Times:    Recent vancomycin administrations                     vancomycin (VANCOCIN) 1,500 mg in sodium chloride 0.9 % 250 mL IVPB ([id number]) (mg) 1,500 mg New Bag 11/18/24 1143    vancomycin (VANCOCIN) 1,500 mg in sodium chloride 0.9 % 250 mL IVPB ([id number]) (mg) 1,500 mg New Bag 11/17/24 0923    vancomycin (VANCOCIN) 500 mg in sodium chloride 0.9 % 100 mL IVPB (mg) 500 mg New Bag 11/16/24 1534                  Assessment:  Patient is a 79 y.o. male who has been initiated on vancomycin  Estimated Creatinine Clearance: 45 mL/min (A) (based on SCr of 1.5 mg/dL (H)).  To dose vancomycin, pharmacy will be utilizing dosing based off of levels because of patient's renal impairment/insufficiency  11/17: scr 2.2, trending towards reported baseline of 1.3, wbc 30, vancomycin 1750 mg given 11/16, random level 6.1 ~ 13 hours after last dose, dosing based on levels due to JACOB  11/18: Scr 1.8, random level is 14.2 mcg/mL  11/19: Scr continues to improve to 1.5 
Power chg 2 lumen picc Placement 11/19/2024    Product number: [id number]   Lot Number: [id number]      Ultrasound: yes/sonosite   Right Basilic vein:                Upper Arm Circumference: (CM) 28    Size:(FR)/GUAGE 5.5 fr    Exposed Length: (CM) 2    Internal Length: (CM) 41   Cut: (CM) 12   Vein Measurement: 0.55              Lidocaine Given: yes 1 %. 3 ml from kit.  Luciano Bearden RN  11/19/2024  12:40 PM                         
Pt continues to reach for lines and tubes despite attempts to deter.  Restraints continued for pt safety. Kori Devine RN  
Spontaneous Parameters performed    VT = 693 ml  f = 11  B/M  Ve = 8.57 L/M  NIF = -19  cmH2O  RSBI = 20    Audible leak heard when cuff deflated    Performed by García Jennings RCP  
The Family was emotional due to the fact that the patient was to be extubated today. They had questions and concerns regarding the patients ability to continue to breath on his own. The patient nodded that we could prayer for him. He opened his eyes when being spoken. The  actively listened to the family and had prayer with the patient and his family.    If additional support is needed please reach out to [facility] (ext [phone]).    Rev RAMAKRISHNA Gustafson MDIV,      
dry. No rashes were noted. No jaundice.  HEENT: Eyes show round, and reactive pupils. Moist mucous membranes, no ulcerations, no thrush.   Neck: Supple to movements. No lymphadenopathy.   Chest: No use of accessory muscles to breathe. Symmetrical expansion. Auscultation reveals bilateral rhonchi and crackles  Cardiovascular: S1 and S2 are rhythmic and regular. No murmurs appreciated.   Abdomen: Positive bowel sounds to auscultation. Benign to palpation. No masses felt. No hepatosplenomegaly.  Genitourinary: No pain in the lower abdomen  Extremities: No clubbing, no cyanosis, no edema.  Musculoskeletal: No pain in range of motion of any joints  Neurological: Following commands, no focal neurodeficit  Lines: peripheral    Laboratory and Tests Review:  Lab Results   Component Value Date    WBC 6.8 11/20/2024    WBC 6.7 11/19/2024    WBC 16.1 (H) 11/18/2024    HGB 8.1 (L) 11/20/2024    HCT 26.4 (L) 11/20/2024    MCV 95.0 11/20/2024    PLT 81 (L) 11/20/2024     Lab Results   Component Value Date    NEUTROABS 5.32 11/20/2024    NEUTROABS 5.37 11/19/2024    NEUTROABS 14.49 (H) 11/18/2024     No results found for: \"CRPHS\"  Lab Results   Component Value Date    ALT 18 11/20/2024    AST 21 11/20/2024    ALKPHOS 55 11/20/2024    BILITOT 0.3 11/20/2024     Lab Results   Component Value Date/Time     11/20/2024 04:04 AM    K 3.8 11/20/2024 04:04 AM     11/20/2024 04:04 AM    CO2 22 11/20/2024 04:04 AM    BUN 52 11/20/2024 04:04 AM    CREATININE 1.3 11/20/2024 04:04 AM    CREATININE 1.3 11/19/2024 04:53 PM    CREATININE 1.5 11/19/2024 04:03 AM    LABGLOM 57 11/20/2024 04:04 AM    GLUCOSE 178 11/20/2024 04:04 AM    CALCIUM 8.4 11/20/2024 04:04 AM    BILITOT 0.3 11/20/2024 04:04 AM    ALKPHOS 55 11/20/2024 04:04 AM    AST 21 11/20/2024 04:04 AM    ALT 18 11/20/2024 04:04 AM     Lab Results   Component Value Date    CRP 39.0 (H) 11/20/2024    CRP 61.0 (H) 11/19/2024    .0 (H) 11/15/2024     Lab Results   Component 
 --   --  87*       Recent Labs     11/16/24  0443 11/16/24  0945 11/17/24  0416 11/17/24  1500 11/18/24  0404   *   < > 153* 148* 148*   K 4.2   < > 4.4 4.6 4.5   *   < > 119* 120* 118*   CO2 25   < > 22 23 19*   BUN 73*   < > 61* 61* 60*   CREATININE 3.0*   < > 2.2* 2.1* 1.8*   CALCIUM 8.4*   < > 8.7 8.6 8.3*   PHOS 2.8  --  2.1*  --  3.0    < > = values in this interval not displayed.       Recent Labs     11/16/24 0443 11/17/24 0416 11/18/24 0404   ALKPHOS 78 68 61   ALT 22 20 18   AST 44* 33 22   BILITOT 0.6 0.4 0.3       Recent Labs     11/16/24 0443 11/17/24 0416 11/18/24 0404   INR 1.6 1.2 1.1       Recent Labs     11/15/24  1951   CKTOTAL 532*       Chronic labs:  Lab Results   Component Value Date    TSH 0.74 11/15/2024    INR 1.1 11/18/2024         +++++++++++++++++++++++++++++++++++++++++++++++++  DO Emily Soares [facility].  +++++++++++++++++++++++++++++++++++++++++++++++++  NOTE: This report was transcribed using voice recognition software. Every effort was made to ensure accuracy; however, inadvertent computerized transcription errors may be present.       
04:04 AM    PROMYELOPCT PENDING 11/18/2024 04:04 AM    MONOPCT PENDING 11/18/2024 04:04 AM    MYELOPCT PENDING 11/18/2024 04:04 AM    EOSPCT PENDING 11/18/2024 04:04 AM    BASOPCT PENDING 11/18/2024 04:04 AM    MONOSABS PENDING 11/18/2024 04:04 AM    LYMPHSABS PENDING 11/18/2024 04:04 AM    EOSABS PENDING 11/18/2024 04:04 AM    BASOSABS PENDING 11/18/2024 04:04 AM     CMP:    Lab Results   Component Value Date/Time     11/18/2024 04:04 AM    K 4.5 11/18/2024 04:04 AM     11/18/2024 04:04 AM    CO2 19 11/18/2024 04:04 AM    BUN 60 11/18/2024 04:04 AM    CREATININE 1.8 11/18/2024 04:04 AM    LABGLOM 38 11/18/2024 04:04 AM    GLUCOSE 203 11/18/2024 04:04 AM    CALCIUM 8.3 11/18/2024 04:04 AM    BILITOT 0.3 11/18/2024 04:04 AM    ALKPHOS 61 11/18/2024 04:04 AM    AST 22 11/18/2024 04:04 AM    ALT 18 11/18/2024 04:04 AM       MRI Brain:  1. Redemonstration of subacute bilateral subdural hematomas measuring up to 5  mm in thickness on the right and 4 mm in thickness on the left.  These areas  of hemorrhage appear similar compared with prior CT from November 15th.  2. No evidence of acute intracranial ischemia or intracranial edema.    Rapid EEG  This abnormal study showed evidence of:     A potential for seizures with either left sided or generalized onset  A moderate to severe nonspecific encephalopathy     Structural abnormalities should be considered for the findings above and appropriate imaging obtained if clinically indicated.      No definite seizures noted during this study.    I independently reviewed the labs and imaging studies today.     Assessment:     Patient admitted with alteration mentation in the setting of COVID-19 and bilateral subdural hematomas   Cerebellar EEG demonstrated risk for seizures-maintain on renally dosed Keppra as well as Vimpat 100 mg twice a day      Plan:     EEG pending    Neurology will follow     LINNEA Adan - CNS  10:07 AM  11/18/2024  
1800 106/64 -- -- 69 18 -- --   11/17/24 1754 -- -- -- 69 18 -- --   11/17/24 1753 -- -- -- 68 18 -- --   11/17/24 1700 108/63 99.3 °F (37.4 °C) Bladder 75 20 -- --   11/17/24 1621 -- -- -- 71 19 98 % --   11/17/24 1620 -- -- -- 74 19 99 % --   11/17/24 1600 103/61 99.2 °F (37.3 °C) Bladder 69 18 -- --   11/17/24 1555 -- -- -- 72 18 -- --   @      Intake/Output Summary (Last 24 hours) at 11/18/2024 1551  Last data filed at 11/18/2024 0940  Gross per 24 hour   Intake 3268.25 ml   Output 635 ml   Net 2633.25 ml         Wt Readings from Last 3 Encounters:   11/15/24 80.7 kg (178 lb)       Constitutional: mechanically intubated and sedated   HEENT: NC/AT, EOMI, sclera and conjunctiva are clear and anicteric, mucus membranes moist, ETT extends from oropharynx   Neck: Trachea midline, mild JVD  Cardiovascular: S1, S2 regular rhythm, no murmur,or rub  Respiratory:  CTAB, diminished in the bases. No crackles, no wheeze  Gastrointestinal:  Soft, nontender, nondistended, NABS  Ext: no edema, feet warm  Skin: dry, no rash  Neuro: sedated       DATA:    Recent Labs     11/16/24 0443 11/17/24  0416 11/18/24  0404   WBC 31.1* 30.4* 16.1*   HGB 9.6* 9.0* 8.2*   HCT 30.7* 28.3* 26.5*   MCV 95.6 94.0 94.0   PLT  --   --  87*     Recent Labs     11/16/24 0443 11/16/24  0945 11/17/24  0416 11/17/24  1500 11/18/24  0404   *   < > 153* 148* 148*   K 4.2   < > 4.4 4.6 4.5   *   < > 119* 120* 118*   CO2 25   < > 22 23 19*   MG 1.7  --  2.2  --  2.1   PHOS 2.8  --  2.1*  --  3.0   BUN 73*   < > 61* 61* 60*   CREATININE 3.0*   < > 2.2* 2.1* 1.8*   ALT 22  --  20  --  18   AST 44*  --  33  --  22   BILITOT 0.6  --  0.4  --  0.3   ALKPHOS 78  --  68  --  61    < > = values in this interval not displayed.       No results found for: \"LABPROT\"      Assessment  Acute kidney injury in the setting of decreased effective volume secondary to sepsis, hypotension, and poor oral intake. Urine indices are consistent with prerenal azotemia. 
thoracic aorta. Lungs/pleura: Opacifications greater portion lower lobes atelectasis versus airspace disease aspiration difficult to exclude without effusion. Upper Abdomen: Limited images of the upper abdomen are unremarkable. Soft Tissues/Bones: No acute bone or soft tissue abnormality.     1. No acute pulmonary artery embolism. 2. Bilateral lower lobe atelectasis versus airspace disease.     XR CHEST PORTABLE    Result Date: 11/15/2024  EXAMINATION: ONE SUPINE XRAY VIEW(S) OF THE ABDOMEN; ONE XRAY VIEW OF THE CHEST 11/15/2024 6:04 am; 11/15/2024 6:03 am COMPARISON: None. HISTORY: ORDERING SYSTEM PROVIDED HISTORY: ng placement TECHNOLOGIST PROVIDED HISTORY: Reason for exam:->ng placement; ORDERING SYSTEM PROVIDED HISTORY: sob TECHNOLOGIST PROVIDED HISTORY: Reason for exam:->sob FINDINGS: Normal cardiomediastinal silhouette.  Mild right basilar atelectasis, otherwise, lungs clear.  No pneumothorax or effusion. Body wall soft tissues unremarkable. Osseous thorax intact.Sigmoid thoracolumbar scoliosis. Endotracheal tube tip with good positioning 4.1 cm above the chantell. Gastric tube with good positioning proximal side hole beneath the hemidiaphragms.  No ileus or obstruction.     1. No acute cardiopulmonary process. 2. Endotracheal tube and gastric tube with good positioning.     XR ABDOMEN (KUB) (SINGLE AP VIEW)    Result Date: 11/15/2024  EXAMINATION: ONE SUPINE XRAY VIEW(S) OF THE ABDOMEN; ONE XRAY VIEW OF THE CHEST 11/15/2024 6:04 am; 11/15/2024 6:03 am COMPARISON: None. HISTORY: ORDERING SYSTEM PROVIDED HISTORY: ng placement TECHNOLOGIST PROVIDED HISTORY: Reason for exam:->ng placement; ORDERING SYSTEM PROVIDED HISTORY: sob TECHNOLOGIST PROVIDED HISTORY: Reason for exam:->sob FINDINGS: Normal cardiomediastinal silhouette.  Mild right basilar atelectasis, otherwise, lungs clear.  No pneumothorax or effusion. Body wall soft tissues unremarkable. Osseous thorax intact.Sigmoid thoracolumbar scoliosis. Endotracheal 
\"LABPROT\"      Assessment  Acute kidney injury in the setting of decreased effective volume secondary to sepsis, hypotension, and poor oral intake. Urine indices are consistent with prerenal azotemia. He did receive intravenous contrast early this morning which is likely contributing. Nonoliguric.  Chronic kidney disease stage IIIa per his wife whom reports his typical baseline creatinine level is around 1.3 mg/dL. He is from [place] and there are no previous labs in [facility].   Hypernatremia in the setting of dehydration and volume contraction   Metabolic acidosis due to JACOB  Lactic acidosis   Acute respiratory failure   History of dementia     Urine indices consistent with prerenal azotemia   Cr 3.5--> 2.7 mg/dL  Na 155-->151.  Have been improving appropriately though probably with diuretic therapy, improvement stalled  He did receive contrast yesterday  Nonoliguric     Recommended  Continue hypotonic IV fluid resuscitation stop checking BMP acute 6 hours  Check BMP this afternoon, adjust therapy as warranted, then daily hereafter  Goal of Na correction no more than 8-10 mmol/L in 24 hours   Avoid nephrotoxins as able   Pressor support to maintain MAP > 65 mmHg  Follow labs  Monitor I&O  Continue intensive supportive care       Electronically signed by Kristofer Gregory MD on 11/16/2024 at 3:14 PM    NOTE: This report was transcribed using voice recognition software. Every effort was made to ensure accuracy; however, inadvertent transcription errors may be presen   
      EKG  :     Rhythm Strip :Normal Sinus Rhythm    ECHO  :                        Assessment and Plan of care     Diagnosis:  Acute metabolic Encephalopathy due to Septic shock vs subdural hematoma with a background of dementia   B/L Subdural hematoma without midline shift   Seizure disorder  Bibasilar Aspiration PNA   Staph aureus and Streptococcus pneumoniae pneumonia  Septic Shock due to Asp PNA   Hypernatremia   JACOB   Lactic Acidosis   HAGMA  COVID infection  Staph aureus and Streptococcus bacteremia        Plan:     Neuro: Intubated, not following commands.  CT head is consistent with bilateral subdural hematoma without midline shift.  EEG to rule out seizures.  Cerebella consistent with seizures.  Keppra 500 mg every 12.  Vimpat to continue.  Neurology consult requested.  Patient's seizure stopped with sedation and Keppra.  Taper off sedation and assess mental status.  Plan for MRI brain today.  Pulmonary: Intubated for acute hypoxic respiratory failure and unresponsiveness.  CT chest consistent with bibasilar aspiration pneumonia.  Continue ventilator support.  Cardiovascular: Continues to be on Levophed drip.  Taper of Levophed drip.  Sinus tachycardia improved.  Continue hydrocortisone.  Echo ordered to rule out vegetations.  Abdomen/GI: N.p.o. for now  Renal: Monitor urine output, half-normal saline to continue as per nephrology.  Nephrology consult appreciated.    MSK: Multiple ecchymosis and cut injuries noted in the lower extremities.  Left knee ecchymosis noted.  Skin: Multiple ecchymosis and abrasion injuries noted  ID: [email or web address] and vancomycin to continue.  Repeat blood cultures.  Hematology: Leukocytosis noted  Endocrine: Monitor BS  DVT/GI: Prophylaxis: Heparin prophylactic dose and Protonix  Code Status: Full Code  GOC discussion: Discussed with family at bedside  Disposition: ICU  Total Critical care time spent  35 mins. This did not include any procedures.      During multidisciplinary team 
on 11/20/2024 at 10:47 AM  Critical Care Medicine